# Patient Record
Sex: MALE | Race: BLACK OR AFRICAN AMERICAN
[De-identification: names, ages, dates, MRNs, and addresses within clinical notes are randomized per-mention and may not be internally consistent; named-entity substitution may affect disease eponyms.]

---

## 2021-12-03 ENCOUNTER — HOSPITAL ENCOUNTER (EMERGENCY)
Dept: HOSPITAL 87 - ER | Age: 31
LOS: 1 days | Discharge: HOME | End: 2021-12-04
Payer: MEDICAID

## 2021-12-03 VITALS — WEIGHT: 198.42 LBS | HEIGHT: 77 IN | BODY MASS INDEX: 23.43 KG/M2

## 2021-12-03 DIAGNOSIS — Z20.822: ICD-10-CM

## 2021-12-03 DIAGNOSIS — F31.9: ICD-10-CM

## 2021-12-03 DIAGNOSIS — R45.851: Primary | ICD-10-CM

## 2021-12-03 DIAGNOSIS — F17.290: ICD-10-CM

## 2021-12-03 DIAGNOSIS — F41.9: ICD-10-CM

## 2021-12-03 DIAGNOSIS — F20.9: ICD-10-CM

## 2021-12-03 PROCEDURE — 85025 COMPLETE CBC W/AUTO DIFF WBC: CPT

## 2021-12-03 PROCEDURE — 80307 DRUG TEST PRSMV CHEM ANLYZR: CPT

## 2021-12-03 PROCEDURE — U0003 INFECTIOUS AGENT DETECTION BY NUCLEIC ACID (DNA OR RNA); SEVERE ACUTE RESPIRATORY SYNDROME CORONAVIRUS 2 (SARS-COV-2) (CORONAVIRUS DISEASE [COVID-19]), AMPLIFIED PROBE TECHNIQUE, MAKING USE OF HIGH THROUGHPUT TECHNOLOGIES AS DESCRIBED BY CMS-2020-01-R: HCPCS

## 2021-12-03 PROCEDURE — C9803 HOPD COVID-19 SPEC COLLECT: HCPCS

## 2021-12-03 PROCEDURE — 81003 URINALYSIS AUTO W/O SCOPE: CPT

## 2021-12-03 PROCEDURE — 36415 COLL VENOUS BLD VENIPUNCTURE: CPT

## 2021-12-03 PROCEDURE — 80329 ANALGESICS NON-OPIOID 1 OR 2: CPT

## 2021-12-03 PROCEDURE — G0480 DRUG TEST DEF 1-7 CLASSES: HCPCS

## 2021-12-03 PROCEDURE — 80320 DRUG SCREEN QUANTALCOHOLS: CPT

## 2021-12-03 PROCEDURE — 99285 EMERGENCY DEPT VISIT HI MDM: CPT

## 2021-12-03 PROCEDURE — 80053 COMPREHEN METABOLIC PANEL: CPT

## 2021-12-03 PROCEDURE — 80305 DRUG TEST PRSMV DIR OPT OBS: CPT

## 2021-12-04 VITALS — SYSTOLIC BLOOD PRESSURE: 120 MMHG | DIASTOLIC BLOOD PRESSURE: 68 MMHG

## 2021-12-04 LAB
AMPHETAMINES UR QL SCN: NEGATIVE
APPEARANCE UR: CLEAR
BARBITURATES UR QL SCN: NEGATIVE
BASOPHILS NFR BLD AUTO: 1.1 % (ref 0–2)
BENZODIAZ UR QL SCN: NEGATIVE
BZE UR QL SCN: NEGATIVE
CANNABINOIDS UR QL SCN: (no result)
CHLORIDE SERPL-SCNC: 109 MEQ/L (ref 98–107)
COLOR UR: YELLOW
EOSINOPHIL NFR BLD AUTO: 2.4 % (ref 0–5)
ERYTHROCYTE [DISTWIDTH] IN BLOOD BY AUTOMATED COUNT: 13.8 % (ref 11.6–14.6)
ETHANOL SERPL-MCNC: < 10 MG/DL
HCT VFR BLD AUTO: 41 % (ref 42–52)
HGB BLD-MCNC: 13.8 G/DL (ref 14–18)
HGB UR QL STRIP: NEGATIVE
KETONES UR STRIP-MCNC: NEGATIVE MG/DL
LEUKOCYTE ESTERASE UR QL STRIP: NEGATIVE
LYMPHOCYTES NFR BLD AUTO: 34.2 % (ref 20–50)
MCH RBC QN AUTO: 30.4 PG (ref 28–32)
MCV RBC AUTO: 90.3 FL (ref 80–94)
METHADONE UR QL SCN: NEGATIVE
MONOCYTES NFR BLD AUTO: 8.9 % (ref 2–8)
NEUTROPHILS NFR BLD AUTO: 53.4 % (ref 40–76)
NITRITE UR QL STRIP: NEGATIVE
OPIATES UR QL SCN: NEGATIVE
PCP UR QL SCN: NEGATIVE
PH UR STRIP: 5.5 [PH] (ref 4.5–8)
PLATELET # BLD AUTO: 230 X1000/UL (ref 130–400)
PMV BLD AUTO: 8.5 FL (ref 7.4–10.4)
PROT UR QL STRIP: NEGATIVE
RBC # BLD AUTO: 4.54 MILL/UL (ref 4.7–6.1)
SP GR UR STRIP: 1.01 (ref 1–1.03)
UROBILINOGEN UR STRIP-MCNC: 0.2 E.U./DL (ref 0.2–1)

## 2022-04-25 ENCOUNTER — TELEPHONE (OUTPATIENT)
Dept: BEHAVIORAL HEALTH | Facility: CLINIC | Age: 32
End: 2022-04-25

## 2022-04-25 ENCOUNTER — HOSPITAL ENCOUNTER (INPATIENT)
Facility: CLINIC | Age: 32
LOS: 1 days | Discharge: HOME | DRG: 880 | End: 2022-04-26
Attending: EMERGENCY MEDICINE | Admitting: PSYCHIATRY & NEUROLOGY
Payer: MEDICAID

## 2022-04-25 DIAGNOSIS — F60.2 ANTISOCIAL PERSONALITY DISORDER (H): Primary | ICD-10-CM

## 2022-04-25 DIAGNOSIS — F41.9 ANXIETY: ICD-10-CM

## 2022-04-25 DIAGNOSIS — F20.9 SUBCHRONIC SCHIZOPHRENIA (H): ICD-10-CM

## 2022-04-25 DIAGNOSIS — Z11.52 ENCOUNTER FOR SCREENING LABORATORY TESTING FOR SEVERE ACUTE RESPIRATORY SYNDROME CORONAVIRUS 2 (SARS-COV-2): ICD-10-CM

## 2022-04-25 DIAGNOSIS — F25.0 SCHIZOAFFECTIVE DISORDER, BIPOLAR TYPE (H): ICD-10-CM

## 2022-04-25 DIAGNOSIS — R45.851 SUICIDAL IDEATION: ICD-10-CM

## 2022-04-25 LAB
ALBUMIN SERPL-MCNC: 3.5 G/DL (ref 3.4–5)
ALP SERPL-CCNC: 79 U/L (ref 40–150)
ALT SERPL W P-5'-P-CCNC: 21 U/L (ref 0–70)
AMPHETAMINES UR QL SCN: NORMAL
ANION GAP SERPL CALCULATED.3IONS-SCNC: 3 MMOL/L (ref 3–14)
AST SERPL W P-5'-P-CCNC: 19 U/L (ref 0–45)
BARBITURATES UR QL: NORMAL
BASOPHILS # BLD AUTO: 0.1 10E3/UL (ref 0–0.2)
BASOPHILS NFR BLD AUTO: 1 %
BENZODIAZ UR QL: NORMAL
BILIRUB SERPL-MCNC: 0.5 MG/DL (ref 0.2–1.3)
BUN SERPL-MCNC: 16 MG/DL (ref 7–30)
CALCIUM SERPL-MCNC: 8.9 MG/DL (ref 8.5–10.1)
CANNABINOIDS UR QL SCN: NORMAL
CHLORIDE BLD-SCNC: 108 MMOL/L (ref 94–109)
CO2 SERPL-SCNC: 27 MMOL/L (ref 20–32)
COCAINE UR QL: NORMAL
CREAT SERPL-MCNC: 0.95 MG/DL (ref 0.66–1.25)
EOSINOPHIL # BLD AUTO: 0.2 10E3/UL (ref 0–0.7)
EOSINOPHIL NFR BLD AUTO: 2 %
ERYTHROCYTE [DISTWIDTH] IN BLOOD BY AUTOMATED COUNT: 12.6 % (ref 10–15)
GFR SERPL CREATININE-BSD FRML MDRD: >90 ML/MIN/1.73M2
GLUCOSE BLD-MCNC: 107 MG/DL (ref 70–99)
HCT VFR BLD AUTO: 45 % (ref 40–53)
HGB BLD-MCNC: 15.3 G/DL (ref 13.3–17.7)
IMM GRANULOCYTES # BLD: 0 10E3/UL
IMM GRANULOCYTES NFR BLD: 1 %
LYMPHOCYTES # BLD AUTO: 2.6 10E3/UL (ref 0.8–5.3)
LYMPHOCYTES NFR BLD AUTO: 40 %
MCH RBC QN AUTO: 30.6 PG (ref 26.5–33)
MCHC RBC AUTO-ENTMCNC: 34 G/DL (ref 31.5–36.5)
MCV RBC AUTO: 90 FL (ref 78–100)
MONOCYTES # BLD AUTO: 0.7 10E3/UL (ref 0–1.3)
MONOCYTES NFR BLD AUTO: 11 %
NEUTROPHILS # BLD AUTO: 2.9 10E3/UL (ref 1.6–8.3)
NEUTROPHILS NFR BLD AUTO: 45 %
NRBC # BLD AUTO: 0 10E3/UL
NRBC BLD AUTO-RTO: 0 /100
OPIATES UR QL SCN: NORMAL
PLATELET # BLD AUTO: 221 10E3/UL (ref 150–450)
POTASSIUM BLD-SCNC: 4 MMOL/L (ref 3.4–5.3)
PROT SERPL-MCNC: 6.6 G/DL (ref 6.8–8.8)
RBC # BLD AUTO: 5 10E6/UL (ref 4.4–5.9)
SARS-COV-2 RNA RESP QL NAA+PROBE: NEGATIVE
SODIUM SERPL-SCNC: 138 MMOL/L (ref 133–144)
WBC # BLD AUTO: 6.6 10E3/UL (ref 4–11)

## 2022-04-25 PROCEDURE — 99285 EMERGENCY DEPT VISIT HI MDM: CPT | Mod: 25 | Performed by: EMERGENCY MEDICINE

## 2022-04-25 PROCEDURE — 99291 CRITICAL CARE FIRST HOUR: CPT | Performed by: EMERGENCY MEDICINE

## 2022-04-25 PROCEDURE — 85025 COMPLETE CBC W/AUTO DIFF WBC: CPT | Performed by: FAMILY MEDICINE

## 2022-04-25 PROCEDURE — 90791 PSYCH DIAGNOSTIC EVALUATION: CPT

## 2022-04-25 PROCEDURE — 36415 COLL VENOUS BLD VENIPUNCTURE: CPT | Performed by: FAMILY MEDICINE

## 2022-04-25 PROCEDURE — C9803 HOPD COVID-19 SPEC COLLECT: HCPCS | Performed by: EMERGENCY MEDICINE

## 2022-04-25 PROCEDURE — 250N000013 HC RX MED GY IP 250 OP 250 PS 637: Performed by: EMERGENCY MEDICINE

## 2022-04-25 PROCEDURE — U0005 INFEC AGEN DETEC AMPLI PROBE: HCPCS | Performed by: EMERGENCY MEDICINE

## 2022-04-25 PROCEDURE — 80307 DRUG TEST PRSMV CHEM ANLYZR: CPT | Performed by: EMERGENCY MEDICINE

## 2022-04-25 PROCEDURE — 80053 COMPREHEN METABOLIC PANEL: CPT | Performed by: FAMILY MEDICINE

## 2022-04-25 RX ORDER — POLYETHYLENE GLYCOL 3350 17 G/17G
17 POWDER, FOR SOLUTION ORAL ONCE
Status: COMPLETED | OUTPATIENT
Start: 2022-04-25 | End: 2022-04-25

## 2022-04-25 RX ORDER — OLANZAPINE 10 MG/1
10 TABLET, ORALLY DISINTEGRATING ORAL ONCE
Status: COMPLETED | OUTPATIENT
Start: 2022-04-25 | End: 2022-04-25

## 2022-04-25 RX ADMIN — POLYETHYLENE GLYCOL 3350 17 G: 17 POWDER, FOR SOLUTION ORAL at 15:57

## 2022-04-25 RX ADMIN — OLANZAPINE 10 MG: 10 TABLET, ORALLY DISINTEGRATING ORAL at 22:16

## 2022-04-25 RX ADMIN — OLANZAPINE 10 MG: 10 TABLET, ORALLY DISINTEGRATING ORAL at 04:14

## 2022-04-25 NOTE — PHARMACY-ADMISSION MEDICATION HISTORY
Admission Medication History Completed by Pharmacy    See [facility] Admission Navigator for allergy information, preferred outpatient pharmacy, prior to admission medications and immunization status.     Medication History Sources:     Patient    Sure Scripts    Care Everywhere    Changes made to PTA medication list (reason):    Added: None    Deleted: (all per patient, Care Everywhere, and Sure Scripts)  o Albuterol nebulizer solution: Take 3mL by nebulization every 6 hours as needed for shortness of breath/dyspnea  o Amitriptyline 50mg tablet: Take 1 tablet by mouth at bedtime  o Haloperidol 2mg tablet: Take 1 tablet by mouth 1 times daily  o Hydroxyzine 25mg tablet: Take 2 tablets by mouth daily  o Oxcarbazepine 150mg tablet: Take 1 tablet by mouth 2 times daily  o Polyethylene glycol powder: Take 17g by mouth daily  o Risperidone 4mg tablet: Take 1 tablet by mouth daily    Changed: None    Additional Information:    Patient was a good historian of his medications and confirms that he has not taken any prescription medications, over-the-counters, or supplements in about 6 months.    Patient states that he was previously on bupropion XL 150mg once daily and olanzapine 5mg daily but has not taken either of these in approximately 6 months.    Prior to Admission medications    Not on File       Date completed: 04/25/22    Medication history completed by: Anna Galdamez

## 2022-04-25 NOTE — TELEPHONE ENCOUNTER
Updated Bed Search @1030AM:   Anywhere for placement     [facility]   [facility] @ cap   [facility] @ cap   [facility] @ cap   [facility] @ cap   [facility] @ cap   [facility] @ cap   [facility] @ cap   [facility] @ cap   [facility] @ [facility] posting two avail beds, mixed unit, low acuity only. Pt not appropriate.    [facility] @ cap   [facility] @ cap   [facility] @ cap   [facility] @ cap  [facility] posting one avail bed. Must be on 72 HH.Pt not appropriate.   [facility] @ cap  [facility] @ cap   [facility] @ cap    [facility] posting three avail beds, low acuity only.  Pt not appropriate.   [facility] posting five avail beds. No recent violent/aggression. Pt not appropriate.   [facility] @ cap   [facility] posting one avail bed. Voluntary low acuity pts only.  Pt not appropriate.   [facility] posting two avail beds. [phone]. Per call to [facility], they are able to review for placement. Fax clinical to [phone].   3837am - Clinical faxed for review. Intake awaiting response.   1116am - Intake text paged ED charge requested covid and UDS be collected for review   [facility] posting one avail bed. [phone]. Per call to Yul[facility], they will call their provider to see if the pt would be a good fit w their acuity, and call intake back. Didn't request clinical at this time.   101pm - [facility] called and reports they cannot accommodate the pts acuity level at this time.     Pt remains on work list until appropriate placement is available

## 2022-04-25 NOTE — TELEPHONE ENCOUNTER
Patient cleared and ready for behavioral bed placement: Yes   S: 04:45 DEC call, 330/M, [place] ED, SI and AH    B: Pt endorses paranoia and AH of voices telling him to kill himself by walking into traffic. Pt also attempted to tie a sheet around his neck while in the ED. Hx of schizoaffective d/o, bipolar type. Pt has not been taking any psych medications. No reports of physical aggression. Pt has been to 3 different hospitals within the last 24 hours. Pt reports he used meth a few days ago and marijuana last month. No acute medical concerns. Medically cleared, eating, drinking, ambulating indep    A: Voluntary - anywhere for placement    UDS: needs to be collected  Covid test needs to be ordered/collected    R: Pt placed on work list until appropriate placement is available
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
## 2022-04-25 NOTE — ED NOTES
[facility] ED Mental Health Handoff Note:       Brief HPI:  This is a 32 year old male signed out to me by Dr. Lynch.  See initial ED Provider note for full details of the presentation. Interval history is pertinent for none. Please see Dr. Lynch's note for details of previous Code 21 and restraints.  He was no longer in restraints upon my assumption of care and these had been discontinued at 6 am prior to my arrival.  He is otherwise been calm here.  No further events during my shift.    Home meds reviewed and ordered/administered: No - hasn't taken meds in 6-7 months per pharmacy    Medically stable for inpatient mental health admission: Yes.    Evaluated by mental health: Yes. The recommendation is for inpatient mental health treatment. Bed search in process    Safety concerns: At the time I received sign out, there were no safety concerns.    Hold Status:  Active Orders   Legal    Health Officer Authority to Detain (SHRAVAN)     Frequency: Effective Now     Start Date/Time: 04/25/22 0543      Number of Occurrences: Until Specified     Order Comments: This patient presented with circumstances that have led me to be reasonably suspicious that the patient is at significant risk of self-harm. The patient's judgment to this situation appears to be impaired. Given the circumstances in which the patient presented, it is likely that the patient is at significant risk of attempting self harm if this situation is not investigated further. I am highly concerned that the patient is mentally ill and currently cannot safely care for oneself. This represents endangerment to the patient's well-being and safety, and I am placing a Health Officer Authority hold upon the patient at this time.              Exam:   Patient Vitals for the past 24 hrs:   BP Temp Temp src Pulse Resp SpO2 Weight   04/25/22 1302 101/57 97.2  F (36.2  C) Oral (!) 46 14 98 % --   04/25/22 0143 133/79 97.9  F (36.6  C) Oral 68 16 97 % 91.9 kg (202 lb 8  oz)           ED Course:    Medications   OLANZapine zydis (zyPREXA) ODT tab 10 mg (10 mg Oral Given 4/25/22 4090)   polyethylene glycol (MIRALAX) Packet 17 g (17 g Oral Given 4/25/22 8738)            There were no significant events during my shift.    Patient was signed out to the oncoming provider, Dr. cAkerman      Impression:    ICD-10-CM    1. Suicidal ideation  R45.851        Plan:    1. Awaiting inpatient mental health admission/transfer.      RESULTS:   Results for orders placed or performed during the hospital encounter of 04/25/22 (from the past 24 hour(s))   Asymptomatic COVID-19 Virus (Coronavirus) by PCR Nasopharyngeal     Status: Normal    Collection Time: 04/25/22 12:30 PM    Specimen: Nasopharyngeal; Swab   Result Value Ref Range    SARS CoV2 PCR Negative Negative    Narrative    Testing was performed using the vanessa  SARS-CoV-2 & Influenza A/B Assay on the vanessa  Mariella  System.  This test should be ordered for the detection of SARS-COV-2 in individuals who meet SARS-CoV-2 clinical and/or epidemiological criteria. Test performance is unknown in asymptomatic patients.  This test is for in vitro diagnostic use under the FDA EUA for laboratories certified under CLIA to perform moderate and/or high complexity testing. This test has not been FDA cleared or approved.  A negative test does not rule out the presence of PCR inhibitors in the specimen or target RNA in concentration below the limit of detection for the assay. The possibility of a false negative should be considered if the patient's recent exposure or clinical presentation suggests COVID-19.  [facility] are certified under the Clinical Laboratory Improvement Amendments of 1988 (CLIA-88) as qualified to perform moderate and/or high complexity laboratory testing.   Comprehensive metabolic panel     Status: Abnormal    Collection Time: 04/25/22 12:31 PM   Result Value Ref Range    Sodium 138 133 - 144 mmol/L    Potassium 4.0 3.4 -  5.3 mmol/L    Chloride 108 94 - 109 mmol/L    Carbon Dioxide (CO2) 27 20 - 32 mmol/L    Anion Gap 3 3 - 14 mmol/L    Urea Nitrogen 16 7 - 30 mg/dL    Creatinine 0.95 0.66 - 1.25 mg/dL    Calcium 8.9 8.5 - 10.1 mg/dL    Glucose 107 (H) 70 - 99 mg/dL    Alkaline Phosphatase 79 40 - 150 U/L    AST 19 0 - 45 U/L    ALT 21 0 - 70 U/L    Protein Total 6.6 (L) 6.8 - 8.8 g/dL    Albumin 3.5 3.4 - 5.0 g/dL    Bilirubin Total 0.5 0.2 - 1.3 mg/dL    GFR Estimate >90 >60 mL/min/1.73m2   CBC with platelets differential     Status: None    Collection Time: 04/25/22 12:31 PM    Narrative    The following orders were created for panel order CBC with platelets differential.  Procedure                               Abnormality         Status                     ---------                               -----------         ------                     CBC with platelets and d...[[id number]]                      Final result                 Please view results for these tests on the individual orders.   CBC with platelets and differential     Status: None    Collection Time: 04/25/22 12:31 PM   Result Value Ref Range    WBC Count 6.6 4.0 - 11.0 10e3/uL    RBC Count 5.00 4.40 - 5.90 10e6/uL    Hemoglobin 15.3 13.3 - 17.7 g/dL    Hematocrit 45.0 40.0 - 53.0 %    MCV 90 78 - 100 fL    MCH 30.6 26.5 - 33.0 pg    MCHC 34.0 31.5 - 36.5 g/dL    RDW 12.6 10.0 - 15.0 %    Platelet Count 221 150 - 450 10e3/uL    % Neutrophils 45 %    % Lymphocytes 40 %    % Monocytes 11 %    % Eosinophils 2 %    % Basophils 1 %    % Immature Granulocytes 1 %    NRBCs per 100 WBC 0 <1 /100    Absolute Neutrophils 2.9 1.6 - 8.3 10e3/uL    Absolute Lymphocytes 2.6 0.8 - 5.3 10e3/uL    Absolute Monocytes 0.7 0.0 - 1.3 10e3/uL    Absolute Eosinophils 0.2 0.0 - 0.7 10e3/uL    Absolute Basophils 0.1 0.0 - 0.2 10e3/uL    Absolute Immature Granulocytes 0.0 <=0.4 10e3/uL    Absolute NRBCs 0.0 10e3/uL   Urine Drugs of Abuse Screen     Status: Normal    Collection Time: 04/25/22  12:32 PM    Narrative    The following orders were created for panel order Urine Drugs of Abuse Screen.  Procedure                               Abnormality         Status                     ---------                               -----------         ------                     Drug abuse screen 1 urin...[[id number]]  Normal              Final result                 Please view results for these tests on the individual orders.   Drug abuse screen 1 urine (ED)     Status: Normal    Collection Time: 04/25/22 12:32 PM   Result Value Ref Range    Amphetamines Urine Screen Negative Screen Negative    Barbiturates Urine Screen Negative Screen Negative    Benzodiazepines Urine Screen Negative Screen Negative    Cannabinoids Urine Screen Negative Screen Negative    Cocaine Urine Screen Negative Screen Negative    Opiates Urine Screen Negative Screen Negative             MD Libia Houston Ashley A, MD  04/25/22 3800

## 2022-04-25 NOTE — ED TRIAGE NOTES
Pt. was at hospital earlier and released.  States is having auditory hallucinations that are telling him to jump into traffic.

## 2022-04-25 NOTE — ED NOTES
Pt. sleeping,  when awakened pt. states he is able to contract or safety and If feels unsafe will notify staff.  Restraints discontinued at this time.  Pt. remains on 1:1.

## 2022-04-25 NOTE — ED NOTES
Staff in cameras noticed pt starting to wrap blanket around his neck. Security and floor staff alerted, and staff intervened with pt choke attempt.

## 2022-04-25 NOTE — ED NOTES
"Late entry:  Pt was calm in the HW, gave the phone to pt Re-entry staff asking to speak with pt.  After pt talked to Reentry staff, pt was given Olanzepine as ordered. Pt took the med with out any issues. Pt continued to be calm, pleasant, respectful to writer. Was marlon to safety.  0440  Writer heard commotion, when writer checked on the patient Security officers had pt on the floor, Security was heard saying \" let go\" Security was apparently informed by camera staff that pt had wrapped the blanket around his neck. Pt continued to fight Security officers, was not letting go of his hold on the blanket, had only 1 had secured by Security officers. Code 21 was called. Once patients all extremities were held and secured, CN was holding pt's lower extremities, writer went to the nurses desk to inform MD.  0500  Pt was placed on 5 points restraints as ordered.  Writer was informed by Code 21 team that pt has blood on his face.  0510  While MD was in the room, pt's face was cleaned, pt had a very small nick on L cheek, lower L earlobe and R cheek.  MD aware of the skin openings. No new orders given.  Pt still currently on restraints. Pt placed on SHRAVAN. Copy given to pt  "

## 2022-04-25 NOTE — ED NOTES
This writer was in cameras and noticed patient had blanket over shoulders and was trying to tighten it around neck. Staff in cameras called security as I ran down the hallway and informed security that patient was choking self. 4 security officers attempted to remove blanket but patient was fighting to keep it and tighten it around neck. Security brought patient to floor and finally got blanket away from patient. Code 21 was called in the process and code team arrived and placed patient in 5 points.

## 2022-04-25 NOTE — ED PROVIDER NOTES
[facility] EMERGENCY DEPARTMENT ([facility])  4/25/22  History     Chief Complaint   Patient presents with     Suicidal     Feels like jumping into traffic.  Was at hospital earlier today, but now is feeling suicidal again     HPI  Lalo Kaur is a 32 year old male who has a significant medical history of polysubstance abuse, hx of chronic SI, schizophrenia, anxiety, and homelessness, ASPD, etoh abuse, meth abuser who presents to the Emergency Department with c/o suicidal ideation.  Patient has chronic suicidal ideation.  Reports that he is more suicidal now as he has been taking about his dead mother.  Denies any recent drug ingestions    3rd hospital visit in 24 hours  Reports auditory hallucinations directing him to jump into traffic.   Visual hallucinations of dead people and snakes walking about    Have been seen by [facility] earlier in the day including APS and was cleared for discharge.    He is interested in crisis shelter evaluation for medication adjustment and stabilization    Medically denies any fevers or chills chest pain or shortness of breath.  No recent trauma.      Past Medical History  Past Medical History:   Diagnosis Date     ADHD (attention deficit hyperactivity disorder)      Anxiety      Asthma      Bipolar disorder (H)      Depression      Paranoid schizophrenia (H)      Past Surgical History:   Procedure Laterality Date     ENT SURGERY       albuterol (2.5 MG/3ML) 0.083% nebulizer solution  amitriptyline (ELAVIL) 50 MG tablet  haloperidol (HALDOL) 2 MG tablet  hydrOXYzine (ATARAX) 25 MG tablet  ORDER FOR DME  OXcarbazepine (TRILEPTAL) 150 MG tablet  polyethylene glycol (MIRALAX) powder  risperiDONE (RISPERDAL) 4 MG tablet      Allergies   Allergen Reactions     Acetaminophen Anaphylaxis, Hives and Rash     Guaifenesin Anaphylaxis, Hives and Rash     Other reaction(s): Rash-Hives       Bees Swelling     Guaifenesin-Dm Cr Rash     Family History  Family History   Problem  Relation Age of Onset     Alcohol/Drug Mother      Hypertension Maternal Grandmother      Hypertension Maternal Grandfather      Psychotic Disorder Maternal Grandfather         schizophrenia     Substance Abuse Mother      Suicide Sister              Suicide Mother         attempt     Social History   Social History     Tobacco Use     Smoking status: Current Every Day Smoker     Packs/day: 1.00     Types: Cigarettes     Smokeless tobacco: Never Used   Substance Use Topics     Alcohol use: Yes     Comment: socially     Drug use: Yes     Types: Marijuana      Past medical history, past surgical history, medications, allergies, family history, and social history were reviewed with the patient. No additional pertinent items.     I have reviewed the Medications, Allergies, Past Medical and Surgical History, and Social History in the Epic system.    Review of Systems  A complete review of systems was performed with pertinent positives and negatives noted in the HPI, and all other systems negative.    Physical Exam   BP: 133/79  Pulse: 68  Temp: 97.9  F (36.6  C)  Resp: 16  Weight: 91.9 kg (202 lb 8 oz)  SpO2: 97 %      Physical Exam  GEN: Well appearing, non toxic, cooperative and conversant.   HEENT: The head is normocephalic and atraumatic. Pupils are equal round and reactive to light. Extraocular motions are intact. There is no facial swelling.   CV: Regular rate   PULM: Unlabored breathing     EXT: Full range of motion.  No edema.  NEURO: Cranial nerves II through XII are intact and symmetric. Bilateral upper and lower extremities grossly show full range of motion without any focal deficits.     PSYCH: Calm and cooperative, interactive.     ED Course     .        Procedures         Within an hour after restraint an in person face to face assessment was completed at 0510, including an evaluation of the patient's immediate reaction to the intervention, behavioral assessment and review/assessment of history, drugs  and medications, recent labs, etc., and behavioral condition.  The patient experienced: No adverse physical outcome from seclusion/restraint initiation.  The intervention of restraint or seclusion needs to continue.      Critical care time: 35 minutes of critical care time for this patient with suicidal ideation, attempt suicide by strangulation while in the emergency department requiring code 21 to be called replacement on one-to-one monitoring and frequent reevaluation while on one-to-one and in restraints.    No results found for this or any previous visit (from the past 24 hour(s)).  Medications   OLANZapine zydis (zyPREXA) ODT tab 10 mg (10 mg Oral Given 4/25/22 0414)             Assessments & Plan (with Medical Decision Making)   Lalo Kaur is a 32 year old male presenting for SI  Requesting restart of medication, treatment for etoh and crisis housing.   Pt understood he needs hospital stabilization for this per his report.     Patient seen by DEC  and agrees that he is low risk for suicidality.  Crisis center bed identified and arrangements made for psychiatry as well as medication adjustment as per patient stated initial request.    Was he was told that a crisis center bed was identified he appeared to be disappointed and was wondering whether he would be transported now or later after being admitted.  Was disappointed to hear that a bed was of available immediately and that he could go shortly.    Shortly after this patient was able to acquire a sheet and attempted to strangle himself.  This is immediately identified by camera and the ACT was quickly interrupted by security.     Code 21 called  Patient then placed on one-to-one in 5 point restraints.     On discussion with pt afterwards he simply notes that he is suicidal thinking about his mother.  Appears to be calm and collected during this  Given these events the patient will be admitted for further evaluation and stabilization    I have  reviewed the nursing notes.    I have reviewed the findings, diagnosis, plan and need for follow up with the patient.    New Prescriptions    No medications on file       Final diagnoses:   Suicidal ideation         Eugene Lynch MD  4/25/2022   formerly [facility] EMERGENCY DEPARTMENT     Eugene Lynch MD  04/25/22 0593

## 2022-04-25 NOTE — TELEPHONE ENCOUNTER
Open DBL avail on 32   316pm - Adam paged for review   400pm - Adam called, reports she will look into it and call intake back   408pm - Adam called and reports accepts     R: 32/Thalia/Adam     Pt placed in queue   415pm - unit charge notified, unit will call for report when staffing is sorted   418pm - ED charge notified via text page

## 2022-04-26 ENCOUNTER — TELEPHONE (OUTPATIENT)
Dept: BEHAVIORAL HEALTH | Facility: CLINIC | Age: 32
End: 2022-04-26

## 2022-04-26 VITALS
HEART RATE: 64 BPM | OXYGEN SATURATION: 100 % | TEMPERATURE: 98.6 F | SYSTOLIC BLOOD PRESSURE: 143 MMHG | WEIGHT: 202.5 LBS | BODY MASS INDEX: 24.01 KG/M2 | RESPIRATION RATE: 18 BRPM | DIASTOLIC BLOOD PRESSURE: 78 MMHG

## 2022-04-26 PROBLEM — R45.851 SUICIDAL IDEATION: Status: ACTIVE | Noted: 2022-04-26

## 2022-04-26 PROCEDURE — 99236 HOSP IP/OBS SAME DATE HI 85: CPT | Performed by: PSYCHIATRY & NEUROLOGY

## 2022-04-26 PROCEDURE — 124N000002 HC R&B MH UMMC

## 2022-04-26 RX ORDER — BUPROPION HYDROCHLORIDE 150 MG/1
150 TABLET ORAL EVERY MORNING
Qty: 7 TABLET | Refills: 0 | Status: SHIPPED | OUTPATIENT
Start: 2022-04-26 | End: 2024-06-03

## 2022-04-26 RX ORDER — HYDROXYZINE HYDROCHLORIDE 25 MG/1
25 TABLET, FILM COATED ORAL EVERY 4 HOURS PRN
Status: DISCONTINUED | OUTPATIENT
Start: 2022-04-26 | End: 2022-04-26 | Stop reason: HOSPADM

## 2022-04-26 RX ORDER — OLANZAPINE 10 MG/1
10 TABLET ORAL AT BEDTIME
Qty: 7 TABLET | Refills: 0 | Status: SHIPPED | OUTPATIENT
Start: 2022-04-26

## 2022-04-26 RX ORDER — IBUPROFEN 200 MG
400 TABLET ORAL EVERY 6 HOURS PRN
Status: DISCONTINUED | OUTPATIENT
Start: 2022-04-26 | End: 2022-04-26 | Stop reason: HOSPADM

## 2022-04-26 RX ORDER — AMOXICILLIN 250 MG
1 CAPSULE ORAL 2 TIMES DAILY PRN
Status: DISCONTINUED | OUTPATIENT
Start: 2022-04-26 | End: 2022-04-26 | Stop reason: HOSPADM

## 2022-04-26 RX ORDER — OLANZAPINE 10 MG/1
10 TABLET ORAL 3 TIMES DAILY PRN
Status: DISCONTINUED | OUTPATIENT
Start: 2022-04-26 | End: 2022-04-26 | Stop reason: HOSPADM

## 2022-04-26 RX ORDER — OLANZAPINE 10 MG/2ML
10 INJECTION, POWDER, FOR SOLUTION INTRAMUSCULAR 3 TIMES DAILY PRN
Status: DISCONTINUED | OUTPATIENT
Start: 2022-04-26 | End: 2022-04-26

## 2022-04-26 RX ORDER — MAGNESIUM HYDROXIDE/ALUMINUM HYDROXICE/SIMETHICONE 120; 1200; 1200 MG/30ML; MG/30ML; MG/30ML
30 SUSPENSION ORAL EVERY 4 HOURS PRN
Status: DISCONTINUED | OUTPATIENT
Start: 2022-04-26 | End: 2022-04-26

## 2022-04-26 RX ORDER — MAGNESIUM HYDROXIDE/ALUMINUM HYDROXICE/SIMETHICONE 120; 1200; 1200 MG/30ML; MG/30ML; MG/30ML
30 SUSPENSION ORAL EVERY 4 HOURS PRN
Status: DISCONTINUED | OUTPATIENT
Start: 2022-04-26 | End: 2022-04-26 | Stop reason: HOSPADM

## 2022-04-26 RX ORDER — HYDROXYZINE HYDROCHLORIDE 25 MG/1
25 TABLET, FILM COATED ORAL EVERY 4 HOURS PRN
Status: DISCONTINUED | OUTPATIENT
Start: 2022-04-26 | End: 2022-04-26

## 2022-04-26 RX ORDER — NICOTINE 21 MG/24HR
1 PATCH, TRANSDERMAL 24 HOURS TRANSDERMAL DAILY
Status: DISCONTINUED | OUTPATIENT
Start: 2022-04-26 | End: 2022-04-26 | Stop reason: HOSPADM

## 2022-04-26 RX ORDER — TRAZODONE HYDROCHLORIDE 50 MG/1
50 TABLET, FILM COATED ORAL
Status: DISCONTINUED | OUTPATIENT
Start: 2022-04-26 | End: 2022-04-26

## 2022-04-26 RX ORDER — OLANZAPINE 10 MG/1
10 TABLET ORAL 3 TIMES DAILY PRN
Status: DISCONTINUED | OUTPATIENT
Start: 2022-04-26 | End: 2022-04-26

## 2022-04-26 RX ORDER — TRAZODONE HYDROCHLORIDE 50 MG/1
50 TABLET, FILM COATED ORAL
Status: DISCONTINUED | OUTPATIENT
Start: 2022-04-26 | End: 2022-04-26 | Stop reason: HOSPADM

## 2022-04-26 RX ORDER — OLANZAPINE 10 MG/2ML
10 INJECTION, POWDER, FOR SOLUTION INTRAMUSCULAR 3 TIMES DAILY PRN
Status: DISCONTINUED | OUTPATIENT
Start: 2022-04-26 | End: 2022-04-26 | Stop reason: HOSPADM

## 2022-04-26 RX ORDER — POLYETHYLENE GLYCOL 3350 17 G
2 POWDER IN PACKET (EA) ORAL
Status: DISCONTINUED | OUTPATIENT
Start: 2022-04-26 | End: 2022-04-26 | Stop reason: HOSPADM

## 2022-04-26 RX ORDER — AMOXICILLIN 250 MG
1 CAPSULE ORAL 2 TIMES DAILY PRN
Status: DISCONTINUED | OUTPATIENT
Start: 2022-04-26 | End: 2022-04-26

## 2022-04-26 ASSESSMENT — ACTIVITIES OF DAILY LIVING (ADL)
WEAR_GLASSES_OR_BLIND: NO
CONCENTRATING,_REMEMBERING_OR_MAKING_DECISIONS_DIFFICULTY: NO
ORAL_HYGIENE: INDEPENDENT
TOILETING_ISSUES: NO
LAUNDRY: UNABLE TO COMPLETE
DIFFICULTY_EATING/SWALLOWING: NO
CHANGE_IN_FUNCTIONAL_STATUS_SINCE_ONSET_OF_CURRENT_ILLNESS/INJURY: NO
ORAL_HYGIENE: INDEPENDENT
DRESS: INDEPENDENT
HEARING_DIFFICULTY_OR_DEAF: NO
LAUNDRY: UNABLE TO COMPLETE
DRESSING/BATHING_DIFFICULTY: NO
WALKING_OR_CLIMBING_STAIRS_DIFFICULTY: NO
FALL_HISTORY_WITHIN_LAST_SIX_MONTHS: NO
DIFFICULTY_COMMUNICATING: NO
HYGIENE/GROOMING: INDEPENDENT
DOING_ERRANDS_INDEPENDENTLY_DIFFICULTY: NO
HYGIENE/GROOMING: INDEPENDENT
DRESS: INDEPENDENT

## 2022-04-26 NOTE — PLAN OF CARE
"Pt is a 32 year old male admitted voluntarily to Station 12 from the [facility] ED at 0215 for SI with a plan to jump in front of traffic. Pt also made an attempt to strangle himself while being treated in the ED yesterday. Subsequently, a code 21 was called and the pt was put into 5-point restraints in the ED.    Pt was placed on a SIO 1:1 due to risk of SI with a recent history of an attempt of self-strangulation.     Upon admission to Station 12, the pt was A &O and ambulatory. The pt presented as  uncooperative, hostile, agitated, and restless. Pt refused a clothing search and VS. Pt was also put on an SIO 1:1 due to refusing a clothing search upon admission to the unit.    Pt's belongings includes a full duffel bag with a lock on it. Pt refused to unlock the duffel bag. He denied the duffel bag contained any weapons,  medications, or drugs. The duffel bag was labeled \"unsearched\" and put in the unit exam room.    Pt demanded to be discharged. This RN writer explained he would need to be seen by the provider before he could be discharged. Pt accepted this information and agreed to go to his room. At first, the pt refused to sign any consent for services. He then later signed consents for services. Pt denied SI and stated he was not hearing voices to kill himself. Pt contracted for safety. He refused to answer any further admission questions. Pt was given a meal, which he ate. Pt then went to bed.    Per chart review: Pt has a history of: chronic SI with at least one past SA by hanging, polysubstance abuse including ETOH and meth, schizophrenia, Bipolar disorder, ASPD, learning disorder, ADHD, anxiety, and homelessness. Pt has a hx of malingering. Pt presented to the ED as paranoid and anxious. He reported AH directing him to jump into traffic and VH of dead people and snakes walking about. Pt has a significant hx of past  hospitalizations. Pt reported he has not taken prescribed medication for 6 months but " had s hx of taking Zyprexa and Wellbutrin. He reported he had urges to use substances. He reported his mother  in .     Allergies: acetaminophen, guaifenesin, bees.    COVID test: negative    Drug screen: all negative    Medical concerns: pt denied. Pt has a hx of asthma.    Obtained admission orders from the on-call provider, Dr. Alexey Davidson, including and order for an SIO. Pt put on SI and SIB precautions.
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
## 2022-04-26 NOTE — PLAN OF CARE
"   04/26/22 0218   Valuables   Patient Belongings locker   Patient Belongings Put in Hospital Secure Location (Security or Locker, etc.) briefcase;cell phone/electronics;plastic bag;glasses;keys;shoes;other (see comments);clothing;money (see comment)   Goal Outcome Evaluation:        Belongings    Briefcase (UNSEARCHED - Has a lock on it)    Clothing  -Sweat shirt (black, bear logo, \"born with silver spoon\")  -Brown leather belt  -Black and white  shirt  -Grey jeans  -Shoes black (with red and white text \"champion\")  -Black sweat pants  -[place] winter hat (purple/yellow)  -Pair of grey hospital socks  -Thin black jacket    Small plastic bag  -Agusto Lip Smacker  -Reading glasses  -[place] state advantage card  -[facility] identification card  -Community Card  -Two quarters ($0.50 total)  -Single Fare token  -Whole foods receipts (2)  -[employer] receipt (1)  -Transfer fare ticket  -[place] note  -[place] card  -Empty plastic bag  -Single key (1)  -White outlet  (round with USB port)  -Small black battey pack \"Powerxcel) with USB cord  -Black outlet  \"alcatel\" with USB cord  -Small black Tablet with USB charging cord     A               Admission:  I am responsible for any personal items that are not sent to the safe or pharmacy.  [facility] is not responsible for loss, theft or damage of any property in my possession.    Signature:  _________________________________ Date: _______  Time: _____                                              Staff Signature:  ____________________________ Date: ________  Time: _____      2nd Staff person, if patient is unable/unwilling to sign:    Signature: ________________________________ Date: ________  Time: _____     Discharge:  [facility] has returned all of my personal belongings:    Signature: _________________________________ Date: ________  Time: _____                                          Staff Signature:  ____________________________ Date: " ________  Time: _____
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
## 2022-04-26 NOTE — PLAN OF CARE
04/26/22 1011   Interprofessional Rounds   Participants ;CTC;psychiatrist;nursing;social work   Team Discussion   Participants Felicia Albert MD, Nino Haynes MD (Resident), Ute, RN, Shyann, RN, Shahnaz RN, CORAZON Rahman, CORAZON Bustillo   Progress Continuing to assess   Anticipated length of stay 1 day   Continued Stay Criteria/Rationale New patient and will be assessed today by psychiatrist   Medical/Physical No medical concerns at this time   Plan Attending psychiatrist and Resident doctor will meet with patient to assess psychiatric needs and to discuss medication options. Patient is requesting discharge as  he wishes to return to [facility] where he lives in transitional housing. While hospitalized patient will be encouraged to attend therapy groups and to participate in unit prpogramming. CTC will coordinate disposition and aftercare plan.   Rationale for change in precautions or plan No change   Anticipated Discharge Disposition other (see comments)   PRECAUTIONS AND SAFETY    Behavioral Orders   Procedures    1:1 Nursing    Code 1 - Restrict to Unit    Discontinue 1:1 attendant for suicide risk     Order Specific Question:   I have performed an in person assessment of the patient     Answer:   Based on this assessment the patient no longer requires a one on one attendant at this point in time.     Order Specific Question:   Rationale     Answer:   Medical Record Reviewed     Order Specific Question:   Rationale     Answer:   Patient States able to remain safe in hospital     Order Specific Question:   Rationale     Answer:   Modifications to care environment made to mitigate safety risk    Routine Programming     As clinically indicated    Self Injury Precaution    Status 15     Every 15 minutes.    Status Individual Observation     Patient SIO status reviewed with team/RN.  Please also refer to RN/team documentation for add'l detail.    -SIO staff to monitor following which have  contributed to patient being on SIO:  Patient was noticed via camera trying to tighten a blanket  that was on his shoulders around his neck in the E.R.     -Possible interventions SIO staff could use to support patient's treatment progress:  Assess for marlon to safety while on unit  Distant monitoring to assess for safety while not on close proximity to SIO staff    -When following observed, team will review discontinuation of SIO:  After 24 -48 hours of marlon to safety and psychiatric stabilization with medication adherence.     Order Specific Question:   CONTINUOUS 24 hours / day     Answer:   5 feet     Order Specific Question:   Indications for SIO     Answer:   Suicide risk    Suicide precautions     Patients on Suicide Precautions should have a Combination Diet ordered that includes a Diet selection(s) AND a Behavioral Tray selection for Safe Tray - with utensils, or Safe Tray - NO utensils         Safety  Safety WDL: .WDL except, safety factors  Patient Location: patient room, own  Observed Behavior: angry/hostile  Safety Measures: belongings check completed, clinical history reviewed, environmental rounds completed, safety plan reviewed, safety rounds completed, safety plan mutually developed, self-directed behavior promoted, suicide assessment completed, suicide check-in completed  Diversional Activity: other (see comments) (sleep)  De-Escalation Techniques: 1:1 observation initiated, appropriate behavior reinforced, quiet time facilitated, verbally redirected  Suicidality: Minimal furniture in room, SIO (Status Individual Observation)  (NOTE - order will specify distance, Behavioral scrubs (pajamas), Minimal personal belongings in room, Optimize communication / relationship to minimize opportunity for self-harm

## 2022-04-26 NOTE — PLAN OF CARE
Assessment/Intervention/Current Symtoms and Care Coordination    -Chart review    -Rounded with team, addressed patient needs/concerns    Current Symptoms include the following:  Patient appears in stable mood.  He is requesting to be discharged in order to purchase a bus ticket and return to [facility].    Discharge Plan or Goal  Pending stabilization & development of a safe discharge plan.  Considerations include:  Return to [place] and Transitional  Housing arrangement.    Barriers to Discharge  Patient requires further psychiatric stabilization due to current symptomology    Referral Status  No referrals have been made this hospitalization    Legal Status  Voluntary

## 2022-04-26 NOTE — PLAN OF CARE
Problem: Sleep Disturbance  Goal: Adequate Sleep/Rest  4/26/2022 0622 by Shahnaz Baez, RN  Outcome: Ongoing, Progressing  4/26/2022 0244 by Shahnaz Baez RN  Outcome: Ongoing, Progressing   Goal Outcome Evaluation:    Plan of Care Reviewed With: patient     Pt admitted overnight. Pt appeared to sleep 3.75 hours. Pt continued on an SIO 1:1 for risk of SI. No prns or snacks given. No other concerns reported or observed. Will continue to monitor.

## 2022-04-26 NOTE — TELEPHONE ENCOUNTER
R: 12 / [facility]  00:51 - Charge RN on 12 reviewed and would be able to take pt tonight. 01:06 - Called [facility]. Awaiting callback from provider. 02:06 - On call, Lety, accepts for MH admission. 02:11 - updated unit.

## 2022-04-26 NOTE — PLAN OF CARE
"Pt's SIO 1:1 discontinued due to pt no longer having SI or AH telling him to kill himself. Pt denied SI/SIB/HI/AH/VH. Pt has been pleasant and cooperative for most of the day. He took a shower and ate all meals. The provider put in a discharge order for today for the pt. Pt verbalized readiness and satisfaction with plan to discharge today. Discharge teaching and medication education provided. AVS reviewed and signed. Pt received copy of AVS and verbalizes understanding. Pt stated he plans to follow the provider recommendations for care. Pt denied access to guns. Pt stated he plans to take a bus to the [facility] [employer] to obtain an airline ticket online and then he plans to go to the [employer] [employer] to fly to [place] today. Patient denied any further questions and is now discharged at this time. Pt signed for medications and belongings. Pt expressed agitation that his belongings had been searched by staff at the time of his admission. He stated he wanted to be discharged immediately because \"He is no fool,\" At 1310, the pt left the unit with discharge medications and belongings to aboard [facility] city bus [facility].                 "

## 2022-04-26 NOTE — ED NOTES
ED to Behavioral Floor Handoff    SITUATION  Lalo Kaur is a 32 year old male who speaks English and lives in a home alone The patient arrived in the ED by private car from emergency room with a complaint of Suicidal (Feels like jumping into traffic.  Was at hospital earlier today, but now is feeling suicidal again)  .The patient's current symptoms started/worsened 2 day(s) ago and during this time the symptoms have decreased.   In the ED, pt was diagnosed with   Final diagnoses:   Suicidal ideation        Initial vitals were: BP: 133/79  Pulse: 68  Temp: 97.9  F (36.6  C)  Resp: 16  Weight: 91.9 kg (202 lb 8 oz)  SpO2: 97 %   --------  Is the patient diabetic? No   If yes, last blood glucose? --     If yes, was this treated in the ED? --  --------  Is the patient inebriated (ETOH) No or Impaired on other substances? No  MSSA done? N/A  Last MSSA score: --    Were withdrawal symptoms treated? N/A  Does the patient have a seizure history? No. If yes, date of most recent seizure--  --------  Is the patient patient experiencing suicidal ideation? has a history of suicidal attempts, most recent 2 days ago    Homicidal ideation? denies current or recent homicidal ideation or behaviors.    Self-injurious behavior/urges? reports current or recent self injurious behavior or ideation including wrapping blanket around neck.  ------  Was pt aggressive in the ED No  Was a code called No  Is the pt now cooperative? Yes  -------  Meds given in ED:   Medications   OLANZapine zydis (zyPREXA) ODT tab 10 mg (10 mg Oral Given 4/25/22 0486)   polyethylene glycol (MIRALAX) Packet 17 g (17 g Oral Given 4/25/22 0276)   OLANZapine zydis (zyPREXA) ODT tab 10 mg (10 mg Oral Given 4/25/22 2916)      Family present during ED course? No  Family currently present? No    BACKGROUND  Does the patient have a cognitive impairment or developmental disability? No  Allergies:   Allergies   Allergen Reactions     Acetaminophen Anaphylaxis, Hives and  Rash     Guaifenesin Anaphylaxis, Hives and Rash     Other reaction(s): Rash-Hives       Bees Swelling     Guaifenesin-Dm Cr Rash   .   Social demographics are   Social History     Socioeconomic History     Marital status: Single     Spouse name: None     Number of children: None     Years of education: None     Highest education level: None   Tobacco Use     Smoking status: Current Every Day Smoker     Packs/day: 1.00     Types: Cigarettes     Smokeless tobacco: Never Used   Substance and Sexual Activity     Alcohol use: Yes     Comment: socially     Drug use: Yes     Types: Marijuana        ASSESSMENT  Labs results   Labs Ordered and Resulted from Time of ED Arrival to Time of ED Departure   COMPREHENSIVE METABOLIC PANEL - Abnormal       Result Value    Sodium 138      Potassium 4.0      Chloride 108      Carbon Dioxide (CO2) 27      Anion Gap 3      Urea Nitrogen 16      Creatinine 0.95      Calcium 8.9      Glucose 107 (*)     Alkaline Phosphatase 79      AST 19      ALT 21      Protein Total 6.6 (*)     Albumin 3.5      Bilirubin Total 0.5      GFR Estimate >90     COVID-19 VIRUS (CORONAVIRUS) BY PCR - Normal    SARS CoV2 PCR Negative     DRUG ABUSE SCREEN 1 URINE (ED) - Normal    Amphetamines Urine Screen Negative      Barbiturates Urine Screen Negative      Benzodiazepines Urine Screen Negative      Cannabinoids Urine Screen Negative      Cocaine Urine Screen Negative      Opiates Urine Screen Negative     CBC WITH PLATELETS AND DIFFERENTIAL    WBC Count 6.6      RBC Count 5.00      Hemoglobin 15.3      Hematocrit 45.0      MCV 90      MCH 30.6      MCHC 34.0      RDW 12.6      Platelet Count 221      % Neutrophils 45      % Lymphocytes 40      % Monocytes 11      % Eosinophils 2      % Basophils 1      % Immature Granulocytes 1      NRBCs per 100 WBC 0      Absolute Neutrophils 2.9      Absolute Lymphocytes 2.6      Absolute Monocytes 0.7      Absolute Eosinophils 0.2      Absolute Basophils 0.1       Absolute Immature Granulocytes 0.0      Absolute NRBCs 0.0        Imaging Studies: No results found for this or any previous visit (from the past 24 hour(s)).   Most recent vital signs /64   Pulse 56   Temp 99  F (37.2  C) (Oral)   Resp 18   Wt 91.9 kg (202 lb 8 oz)   SpO2 99%   BMI 24.01 kg/m     Abnormal labs/tests/findings requiring intervention:---   Pain control: pt had none  Nausea control: pt had none    RECOMMENDATION  Are any infection precautions needed (MRSA, VRE, etc.)? No If yes, what infection? --  ---  Does the patient have mobility issues? independently. If yes, what device does the pt use? ---  ---  Is patient on 72 hour hold or commitment? No If on 72 hour hold, have hold and rights been given to patient? No  Are admitting orders written if after 10 p.m. ?N/A    Elizabeth Amanda RN   [phone] [facility]   [phone] [facility]

## 2022-04-26 NOTE — DISCHARGE INSTRUCTIONS
Behavioral Discharge Planning and Instructions    Summary: You were admitted on 4/25/2022 due to mood decompensation in the context of disrupted living situation. You were treated by Felicia Albert MD and discharged on 4/26/22from [facility] in order to purchase a bus ticket and return to [place].      Main Diagnosis:   Suicidal ideation resolved:  Schizoaffective disorder    Health Care Follow-up: Acute Psychiatric (APS) at [facility]   24-hour walk-in crisis intervention and treatment of behavioral emergencies   Suicide Hotline, giving suicidal callers hope through crisis counseling   Crisis intervention phone service for assessment, information, and referral for psychiatric emergencies.   Treatment of psychiatric emergencies such as acute psychotic conditions, panic states, severe and incapacitating depression, suicidal crisis, danger to others, sudden loss of memory, and situations involving grave mental disability.   Community consultation and education on crisis intervention  Located at [address] [place] [address], [address] [phone]    Attend all scheduled appointments with your outpatient providers. Call at least 24 hours in advance if you need to reschedule an appointment to ensure continued access to your outpatient providers.     Major Treatments, Procedures and Findings:  You were provided with: a psychiatric assessment, assessed for medical stability, medication evaluation and/or management, group therapy, and milieu management    Symptoms to Report: feeling more aggressive, increased confusion, losing more sleep, mood getting worse, or thoughts of suicide    Early warning signs can include: increased depression or anxiety sleep disturbances increased thoughts or behaviors of suicide or self-harm  increased unusual thinking, such as paranoia or hearing voices    Safety and Wellness:  Take all medicines as directed.  Make no changes unless your doctor suggests them.      Follow treatment  "recommendations.  Refrain from alcohol and non-prescribed drugs.  Ask your support system to help you reduce your access to items that could harm yourself or others. Items could include:  Firearms  Medicines (both prescribed and over-the-counter)  Knives and other sharp objects  Ropes and like materials  Car keys  If there is a concern for safety, call 911. If there is a concern for safety, call 911.    Resources:   Crisis Intervention: 665.709.6447 or 311-881-2472 (TTY: 536.643.6778).  Call anytime for help.  National [place] on Mental Illness (www.mn.alonso.org): 735.440.7075 or 281-823-8885.  Suicide Awareness Voices of Education (SAVE) (www.save.org): 677-955-KXWJ (4826)  National Suicide Prevention Line (www.mentalhealthmn.org): 062-432-RMNE (5199)  [facility] Crisis (COPE) Response - Adult 350 758-1859  Text 4 Life: txt \"LIFE\" to 17788 for immediate support and crisis intervention  Crisis Intervention: 808.740.8272 or 403-366-5546. Call anytime for help.     General Medication Instructions:   See your medication sheet(s) for instructions.   Take all medicines as directed.  Make no changes unless your doctor suggests them.   Go to all your doctor visits.  Be sure to have all your required lab tests. This way, your medicines can be refilled on time.  Do not use any drugs not prescribed by your doctor.  Avoid alcohol.    Advance Directives:   Scanned document on file with [facility]? No scanned doc  Is document scanned? No. Copy Requested.  Honoring Choices Your Rights Handout: Informed and given  Was more information offered? Pt declined    The Treatment team has appreciated the opportunity to work with you. If you have any questions or concerns about your recent admission, you can contact the unit which can receive your call 24 hours a day, 7 days a week. They will be able to get in touch with a Provider if needed. The unit number is [phone]  "

## 2022-04-26 NOTE — TELEPHONE ENCOUNTER
R:  8:27 PM [facility] unit 32 Charge called updating Intake that Pt cannot be admitted tonight due to another pt needing a private and the pts high acuity level.    Removed pt from que; updated worklist and removed from Admit Board    9:28 PM Updated [place] ED and updated on 32 decline to admit.    ANS left message to call re: Pt.    10:28 PM Beatriz, ANS, discussed Pt not going to 32.  Suggested unit 12.  Intake stated there might be a higher pt on worklist.      10:43 PM Called unit 12 and gave pts info to review to see if able to admit.

## 2022-04-27 ENCOUNTER — TELEPHONE (OUTPATIENT)
Dept: FAMILY MEDICINE | Facility: CLINIC | Age: 32
End: 2022-04-27

## 2022-04-27 ENCOUNTER — PATIENT OUTREACH (OUTPATIENT)
Dept: CARE COORDINATION | Facility: CLINIC | Age: 32
End: 2022-04-27

## 2022-04-27 DIAGNOSIS — Z71.89 OTHER SPECIFIED COUNSELING: ICD-10-CM

## 2022-04-27 NOTE — TELEPHONE ENCOUNTER
Patient does not have an active phone # on file. Per chart review, a central care coordinator indicates they were unable to reach due to no contact information and patient was recently discharged from the hospital and purchasing a ticket and moving to [place].     Routing to Dr. Devi for FYI.     ALVIN Palacios

## 2022-04-27 NOTE — PROGRESS NOTES
Clinic Care Coordination Contact    Background: Care Coordination referral placed from [facility] discharge report for reason of patient meeting criteria for a TCM outreach call by [facility] team.    Assessment: Upon chart review, [facility] Team member will cancel/close the referral for TCM outreach due to reason below:    Patient does not have a contact number listed. Per chart review, pt discharged to buy a ticket to return to [place].    Plan: Care Coordination referral for TCM outreach canceled.    CELINE Frias  [facility] [place], [facility]

## 2022-04-28 NOTE — H&P
Please see discharge summary on same date.     Brittnee Albert MD  [facility]

## 2023-11-06 ENCOUNTER — HOSPITAL ENCOUNTER (EMERGENCY)
Facility: CLINIC | Age: 33
Discharge: HOME | End: 2023-11-06
Attending: EMERGENCY MEDICINE | Admitting: EMERGENCY MEDICINE
Payer: MEDICAID

## 2023-11-06 VITALS
HEART RATE: 50 BPM | RESPIRATION RATE: 16 BRPM | SYSTOLIC BLOOD PRESSURE: 103 MMHG | TEMPERATURE: 97.4 F | DIASTOLIC BLOOD PRESSURE: 72 MMHG | OXYGEN SATURATION: 99 %

## 2023-11-06 DIAGNOSIS — U07.1 COVID-19: ICD-10-CM

## 2023-11-06 DIAGNOSIS — Z59.00 HOMELESSNESS: ICD-10-CM

## 2023-11-06 PROBLEM — F19.90 SUBSTANCE USE DISORDER: Status: ACTIVE | Noted: 2023-11-06

## 2023-11-06 LAB
FLUAV RNA SPEC QL NAA+PROBE: NEGATIVE
FLUBV RNA RESP QL NAA+PROBE: NEGATIVE
GROUP A STREP BY PCR: NOT DETECTED
RSV RNA SPEC NAA+PROBE: NEGATIVE
SARS-COV-2 RNA RESP QL NAA+PROBE: POSITIVE

## 2023-11-06 PROCEDURE — 87651 STREP A DNA AMP PROBE: CPT | Performed by: EMERGENCY MEDICINE

## 2023-11-06 PROCEDURE — 250N000013 HC RX MED GY IP 250 OP 250 PS 637: Performed by: EMERGENCY MEDICINE

## 2023-11-06 PROCEDURE — 99283 EMERGENCY DEPT VISIT LOW MDM: CPT

## 2023-11-06 PROCEDURE — 87637 SARSCOV2&INF A&B&RSV AMP PRB: CPT | Performed by: EMERGENCY MEDICINE

## 2023-11-06 RX ORDER — IBUPROFEN 600 MG/1
600 TABLET, FILM COATED ORAL ONCE
Status: COMPLETED | OUTPATIENT
Start: 2023-11-06 | End: 2023-11-06

## 2023-11-06 RX ADMIN — IBUPROFEN 600 MG: 600 TABLET ORAL at 04:47

## 2023-11-06 SDOH — ECONOMIC STABILITY - HOUSING INSECURITY: HOMELESSNESS UNSPECIFIED: Z59.00

## 2023-11-06 ASSESSMENT — ACTIVITIES OF DAILY LIVING (ADL)
ADLS_ACUITY_SCORE: 35

## 2023-11-06 NOTE — ED NOTES
Bed: ED18  Expected date:   Expected time:   Means of arrival:   Comments:  Meenakshi 542 33M SI, also has a sore throat, eta 0240

## 2023-11-06 NOTE — ED NOTES
"Pt states, \" I have no where to go.\"  This writer gave pt the resources available to him on his discharge instructions and instructed pt to call the number for shelters.  Pt will also be discharged with bus token.   "

## 2023-11-06 NOTE — ED PROVIDER NOTES
History     Chief Complaint:  Suicidal, Hallucinations, Pharyngitis, and Homeless       The history is provided by the patient.      Lalo Kaur is a 33 year old male presenting with presenting with SI, hallucinations, pharyngitis, and homelessness.  He states he had a sore throat over the last 2 days.  Has not had a fever cough or other symptoms.  He reports that the hallucinations and suicidal ideation have been going on for some time.  He states he is not currently taking his medications.      Independent Historian:   None - Patient Only    Review of External Notes:   I reviewed the [facility] Ed note from 11/5/23 for SI.     Medications:    Bupropion   Olanzapine   Ciclesonide inhaler  Albuterol HFA  Naloxone   Benzonatate   Azithromycin     Past Medical History:    ADHD   Anxiety  Asthma  Bipolar disorder   Depression  Paranoid schizophrenia   Suicidal ideation  Chemical dependency   Psychosis   Substance abuse   Homeless   Chronic back pain   Blastomycosis   Dermatitis   Mood disorder   Personality disorder   Smoker   Suicide attempt   Learning disability     Past Surgical History:    ENT surgery     Physical Exam   Patient Vitals for the past 24 hrs:   BP Temp Temp src Pulse Resp SpO2   11/06/23 0302 137/66 97.4  F (36.3  C) Temporal 50 16 98 %      Physical Exam  General: Appears well-developed and well-nourished.   Head: No signs of trauma.   Mouth/Throat: Oropharynx is clear and moist.   Eyes: Conjunctivae are normal.   Neck: Normal range of motion. No nuchal rigidity. No cervical adenopathy  CV: Normal rate and regular rhythm.    Resp: Effort normal and breath sounds normal. No respiratory distress.   MSK: Normal range of motion. n  Neuro: The patient is alert and oriented. Speech normal.  Skin: Skin is warm and dry. No rash noted.   Psych: sleeping in bed      Emergency Department Course        Laboratory:  Labs Ordered and Resulted from Time of ED Arrival to Time of ED Departure   INFLUENZA A/B, RSV, &  SARS-COV2 PCR - Abnormal       Result Value    Influenza A PCR Negative      Influenza B PCR Negative      RSV PCR Negative      SARS CoV2 PCR Positive (*)    GROUP A STREPTOCOCCUS PCR THROAT SWAB - Normal    Group A strep by PCR Not Detected       Emergency Department Course & Assessments:    Interventions:  Medications   ibuprofen (ADVIL/MOTRIN) tablet 600 mg (600 mg Oral $Given 11/6/23 4741)        Assessments/Consultations/Discussion of Management or Tests:  ED Course as of 11/06/23 0633   Mon Nov 06, 2023   0618 I obtained the history and examined the patient as noted above.        Social Determinants of Health affecting care:   Homelessness/Housing Insecurity    Disposition:  Care of the patient was transferred to my colleague Dr. Hernandez pending DEC eval.     Impression & Plan      Medical Decision Making:  Lalo Kaur presents due to a sore throat along with reported hearing voices and suicidal ideation.  Reports that the sore throat has been going on for couple of days.  COVID swab was obtained that did come back positive.  Patient's vital signs were appropriate and I recommended supportive care and he was given a dose of ibuprofen as he reports an allergy to Tylenol.  With regards to the psychosis and suicidal ideation,  patient has been seen very frequently for this.  He was seen yesterday at [facility] on 2 occasions for the same mental health concerns and was cleared at that time.  He had been given a prescription for medications a few days ago, although he states he has not picked them up.  Patient was signed out to Dr. Hernandez with the plan for DEC evaluation.    Diagnosis:    ICD-10-CM    1. COVID-19  U07.1            Discharge Medications:  New Prescriptions    No medications on file      Scribe Disclosure:  ETIENNE, Suma Ngo, am serving as a scribe at 5:14 AM on 11/6/2023 to document services personally performed by Nino Duarte MD based on my observations and the provider's statements to me.    11/6/2023   Nino Duarte MD Bergenstal, John A, MD  11/06/23 0824

## 2023-11-06 NOTE — ED NOTES
Pt calm and cooperative. He changed into  Scrubs without issues. His belonging placed in Pt belongings bag with labeled on and locked up in the cabin in his room.     Video Observation initiated, patient informed.     Laureen Herzog RN

## 2023-11-06 NOTE — ED PROVIDER NOTES
Patient was signed out to me awaiting DEC assessment.  They try to see him now 3 times and every time he is played possum and will not fully awaken.  This time they are a little more forceful over able to have a conversation.  He stated that he wants to go back to homeless shelter but they probably will be able to take him given that he tested positive for COVID today.  In any event he is alert, no longe feeling suicidal, he clearly has resources, he states he wants to get back on his meds and just received a month worth of his meds on first he will be discharged to home.     Jonas Hernandez MD  11/06/23 4120

## 2023-11-06 NOTE — ED TRIAGE NOTES
BIBA. Pt informed EMS that he is homeless, suicidal, auditory hallucination and sore throat x 2 days. Pt reports hearing voices to run into traffic. EMS reports pt sleepy and cooperative en route denied drug or ETOH use. 's, HR 50 .Hx. Schizophrenia hasn't taken his medications for a while.      Triage Assessment (Adult)       Row Name 11/06/23 0303          Triage Assessment    Airway WDL WDL        Respiratory WDL    Respiratory WDL WDL        Skin Circulation/Temperature WDL    Skin Circulation/Temperature WDL WDL        Cardiac WDL    Cardiac WDL WDL        Peripheral/Neurovascular WDL    Peripheral Neurovascular WDL WDL        Cognitive/Neuro/Behavioral WDL    Cognitive/Neuro/Behavioral WDL WDL;X  Falling asleep in betrween triage questionarie.     Level of Consciousness alert

## 2023-11-06 NOTE — CONSULTS
Diagnostic Evaluation Consultation  Crisis Assessment    Patient Name: Lalo Kaur  Age:  33 year old  Legal Sex: male  Gender Identity: male  Pronouns:   Race: Choose not to Answer  Ethnicity: Choose not to answer  Language: English      Patient was assessed: In person      Patient location: [facility] EMERGENCY DEPT                                 Referral Data and Chief Complaint  Lalo Kaur presents to the ED via EMS. Patient is presenting to the ED for the following concerns: Suicidal ideation, Substance use, Worsening psychosocial stress.   Factors that make the mental health crisis life threatening or complex are:  Pt reports presenting to the ED with concern of suicidal ideation and wanting to get restarted on his medication, and to look for a detox facility. Pt provides varying answers when prompted by Writer to discuss suicidal ideation. Pt denies it at times and then endorses having thoughts of hanging himself. When prompted by Writer, Pt denies having a rope to utilize for hanging himself. Pt reports his goal is to get restarted on medication and go to detox. Per chart review, Pt had his medications refilled on 11/01/2023. Regarding substance use, Pt reports he used methamphetamine and drank alcohol yesterday. Pt presented as fatigued, needing several verbal prompts to engage in assessment. Pt was initially attempted to be seen and was observed to have similar presentation. Pt would minimally respond and then start to audibly snore. Per collaboration with nursing, Pt was easily woken and communicative with them prior to Writer's arrival. Pt tested positive for COVID while at ED.      Informed Consent and Assessment Methods  Explained the crisis assessment process, including applicable information disclosures and limits to confidentiality, assessed understanding of the process, and obtained consent to proceed with the assessment.  Assessment methods included conducting a formal  interview with patient, review of medical records, collaboration with medical staff, and obtaining relevant collateral information from family and community providers when available.  : done     Patient response to interventions: acceptance expressed, verbalizes understanding  Coping skills were attempted to reduce the crisis:  Voluntarily brought self to ED.     History of the Crisis   Per chart review, Pt has extensive history of presenting to the ED with concern of substance use/intoxication, suicidal ideation, and ultimately being discharged. Given chart review, it would appear Pt engages in help-seeking behavior and knows how to reach out for assistance when needed.    Brief Psychosocial History  Family:  Single, Children no  Support System:  None  Employment Status:  unemployed  Source of Income:  unable to assess  Financial Environmental Concerns:  unemployed  Current Hobbies:     Barriers in Personal Life:       Significant Clinical History  Current Anxiety Symptoms:     Current Depression/Trauma:  thoughts of death/suicide  Current Somatic Symptoms:     Current Psychosis/Thought Disturbance:     Current Eating Symptoms:     Chemical Use History:  Alcohol: Other (comments) (Pt reports use on 11/5/23.)  Last Use:: 11/05/23  Benzodiazepines: None  Opiates: None  Cocaine: None  Other Use: Methamphetamines  Last Use:: 11/05/23   Past diagnosis:  Substance Use Disorder, Schizophrenia, Personality Disorder  Family history:   (Unable to determine given presentation.)  Past treatment:  Inpatient Hospitalization, Psychiatric Medication Management, Primary Care  Details of most recent treatment:  Chart review indicates Pt had his psychotropic medications refilled on 11/1/2023. Pt was seen on 11/5/2023 at [facility].  Other relevant history:  Pt has extensive history of help-seeking behavior of presenting to the ED when needing assistance.       Collateral Information  Is there collateral information:  (Chart review. Pt was  minimally engaged in assessment and no emergency contact identified.)       Risk Assessment  [place] Suicide Severity Rating Scale Full Clinical Version:  Suicidal Ideation  Q1 Wish to be Dead (Lifetime): Yes  Q2 Non-Specific Active Suicidal Thoughts (Lifetime): Yes  3. Active Suicidal Ideation with any Methods (Not Plan) Without Intent to Act (Lifetime): Yes  Q4 Active Suicidal Ideation with Some Intent to Act, Without Specific Plan (Lifetime): Yes  Q5 Active Suicidal Ideation with Specific Plan and Intent (Lifetime): Yes  Q6 Suicide Behavior (Lifetime): yes     Suicidal Behavior (Lifetime)  Actual Attempt (Lifetime):  (Per chart review and assessment, it is unclear if Pt has attempted suicide. Chart review indicates chronic suicidal ideation.)  Has subject engaged in non-suicidal self-injurious behavior? (Lifetime):  (Per chart review and assessment, it is unclear if Pt has engaged in NSSIB.)  Interrupted Attempts (Lifetime):  (Per chart review and assessment, it is unclear if Pt has attempted suicide. Chart review indicates chronic suicidal ideation.)  Aborted or Self-Interrupted Attempt (Lifetime):  (Per chart review and assessment, it is unclear if Pt has attempted suicide. Chart review indicates chronic suicidal ideation.)  Preparatory Acts or Behavior (Lifetime):  (Per chart review and assessment, it is unclear if Pt has attempted suicide. Chart review indicates chronic suicidal ideation.)    [place] Suicide Severity Rating Scale Recent:   Suicidal Ideation (Recent)  Q1 Wished to be Dead (Past Month): yes  Q2 Suicidal Thoughts (Past Month): yes  Q3 Suicidal Thought Method: yes  Q4 Suicidal Intent without Specific Plan: no  Q5 Suicide Intent with Specific Plan: no  Level of Risk per Screen: moderate risk  Intensity of Ideation (Recent)  Most Severe Ideation Rating (Past 1 Month):  (Pt did not answer the question. Pt denied yet also reported suicidal ideation during assessment. Chart review indicates chronic  suicidal ideation.)  Frequency (Past 1 Month):  (Pt did not answer the question. Pt denied yet also reported suicidal ideation during assessment. Chart review indicates chronic suicidal ideation.)  Duration (Past 1 Month):  (Pt did not answer the question. Pt denied yet also reported suicidal ideation during assessment. Chart review indicates chronic suicidal ideation.)  Controllability (Past 1 Month):  (Pt did not answer the question. Pt denied yet also reported suicidal ideation during assessment. Chart review indicates chronic suicidal ideation.)  Deterrents (Past 1 Month):  (Pt did not answer the question. Pt denied yet also reported suicidal ideation during assessment. Chart review indicates chronic suicidal ideation.)  Reasons for Ideation (Past 1 Month):  (Pt did not answer the question. Pt denied yet also reported suicidal ideation during assessment. Chart review indicates chronic suicidal ideation.)  Suicidal Behavior (Recent)  Actual Attempt (Past 3 Months):  (Pt did not answer the question. Pt denied yet also reported suicidal ideation during assessment. Chart review indicates chronic suicidal ideation.)  Has subject engaged in non-suicidal self-injurious behavior? (Past 3 Months):  (None identified or reported.)  Interrupted Attempts (Past 3 Months):  (Pt did not answer the question. Pt denied yet also reported suicidal ideation during assessment. Chart review indicates chronic suicidal ideation.)  Aborted or Self-Interrupted Attempt (Past 3 Months):  (Pt did not answer the question. Pt denied yet also reported suicidal ideation during assessment. Chart review indicates chronic suicidal ideation.)  Preparatory Acts or Behavior (Past 3 Months): No    Environmental or Psychosocial Events: ongoing abuse of substances, unstable housing, homelessness, unemployment/underemployment  Protective Factors: Protective Factors: help seeking, optimistic outlook - identification of future goals    Does the patient have  thoughts of harming others? Feels Like Hurting Others: no  Previous Attempt to Hurt Others: no  Is the patient engaging in sexually inappropriate behavior?: no    Is the patient engaging in sexually inappropriate behavior?  no        Mental Status Exam   Affect: Blunted  Appearance: Disheveled  Attention Span/Concentration: Other (please comment) (Pt needed several verbal prompts to engage in assessment and would appear to intermittently fall asleep during the assessment.)  Eye Contact: Avoidant (Pt's eyes were closed during the assessment.)    Fund of Knowledge: Appropriate   Language /Speech Content:    Language /Speech Volume: Normal  Language /Speech Rate/Productions: Minimally Responsive  Recent Memory: Intact  Remote Memory: Intact  Mood:    Orientation to Person: Yes   Orientation to Place: Yes  Orientation to Time of Day: Answer (please comment) (Unable to assess given Pt's minimal participation.)  Orientation to Date: Answer (please comment) (Unable to assess given Pt's minimal participation.)     Situation (Do they understand why they are here?): Yes  Psychomotor Behavior: Normal  Thought Content: Suicidal, Clear  Thought Form: Intact     Mini-Cog Assessment  Number of Words Recalled:    Clock-Drawing Test:     Three Item Recall:    Mini-Cog Total Score:       Medication  Psychotropic medications:   Medication Orders - Psychiatric (From admission, onward)      None             Current Care Team  Patient Care Team:  No Ref-Primary, Physician as PCP - General    Diagnosis  Patient Active Problem List   Diagnosis Code    Generalized anxiety disorder F41.1    Suicidal thoughts R45.851    Mild persistent asthma J45.30    Paranoid schizophrenia (H) F20.0    Bipolar affective disorder (H) F31.9    Suicidal ideation R45.851    Substance use disorder F19.90       Primary Problem This Admission  Active Hospital Problems    Substance use disorder      Paranoid schizophrenia (H)        Clinical Summary and  Substantiation of Recommendations   Pt reports presenting to the ED with concern of suicidal ideation and wanting to get restarted on his medication, and to look for a detox facility. Pt provides varying answers when prompted by Writer to discuss suicidal ideation. Pt denies it at times and then endorses having thoughts of hanging himself. When prompted by Writer, Pt denies having a rope to utilize for hanging himself. Pt reports his goal is to get restarted on medication and go to detox. Per chart review, Pt had his medications refilled on 11/01/2023. Regarding substance use, Pt reports he used methamphetamine and drank alcohol yesterday. Pt presented as fatigued, needing several verbal prompts to engage in assessment. Pt was initially attempted to be seen and was observed to have similar presentation    After mental health crisis assessment and consultation with attending provider, Pt's circumstances and mental state were safe for outpatient management. Close follow-up with a psychiatrist and/or therapist was recommended and community homelessness and CD resources were provided. Pt is to return to the ED if any urgent or potentially life-threatening concerns arise.       Pt presents with chronic suicidal ideation secondary to psychosocial stressors. Per chart review, this would appear to be baseline for Pt. Hospitalization is unlikely to modify high risk factors of poor coping skills, ongoing substance use, lack of social support, and non-compliance to an outpatient plan of care. Furthermore, hospitalization may reinforce maladaptive coping skills and lack of self-determination. Pt has a well documented history of help-seeking behaviors in times of distress.    Recommendation of discharge. Protective factors include: displays resiliency, future focused thinking, and help-seeking behaviors.      Patient coping skills attempted to reduce the crisis:  Voluntarily brought self to ED.    Disposition  Recommended  disposition: Individual Therapy, Medication Management, Substance Abuse Disorder Treatment, Detox        Reviewed case and recommendations with attending provider. Attending Name: Dr. Hernandez       Attending concurs with disposition: yes       Patient and/or validated legal guardian concurs with disposition:    (Pt requested detox or treatment for substance use.)       Final disposition:  discharge    Legal status on admission: Voluntary/Patient has signed consent for treatment    Assessment Details   Total duration spent with the patient: 15 min     CPT code(s) utilized: Non-Billable    KELSI Garza, Psychotherapist  [facility] - Triage & Transition Services  Callback: [phone]

## 2023-11-06 NOTE — DISCHARGE INSTRUCTIONS
Aftercare Plan    Appointment information and/or additional resources available to me:     [facility] HOMELESSNESS RESOURCES    For shelter tonight, start with Adult Shelter Connect Overnight Shelter: [phone]  *Phone lines are closed between 5:30-7:30 pm    [place] (enter on the [address] side near the 8th  corner)  Walk-in Hours: 9 am - 5:30 pm Monday-Friday and 1 pm - 5:30 pm Saturday and Sunday    [facility]  [phone]  [address] [facility]ter  [phone]  [address] [facility]    [facility]  [phone]  [address] [facility]  [phone]  [address] [facility]  [phone]  [address] [place] [address]    To start making a plan for a more long-term solution, contact the Coordinated Entry Homeless Assistance Program:    Coordinated Entry Homeless Assistance  [facility]  [address], [place] [address]  [phone]  Open Wednesdays and Thursdays 8 am-2 pm  The Coordinated Entry System is the [facility]'s approach to organizing and providing housing services for people experiencing homelessness in [facility].  Because housing resources are limited, this process is designed to ensure that individuals and families with the highest vulnerability, service needs, and length of homelessness receive top priority in housing placement.    Assessment changes due to COVID-19 virus    [facility] family assessors will now be conducting assessments by phone. If you are working with a family staying in a place other than a [facility]-funded shelter and is eligible for Coordinated Entry, continue to contact [facility]  at [phone] to set up an assessment.    For single adults, [facility] Housing Services will be suspending drop-in hours at [facility]. To have a Coordinated Entry assessment  completed, call the [facility]' certified assessors: Carlos Manuel at [phone] or Sadia at [phone] directly to set up a time to meet    Call 211 at any time on any day for questions about services and resources available to you.      Family Shelters    [facility] Shelter Team  [phone]  [address] [facility], Floors 5 & 6              Youth, families & disabled adults    Hours: Mon-Fri 8:00 am-4:30 pm.  After-Hours Shelter Team  211         Drop-Ins    [facility]  [phone]  [address] [place] [address] ([facility])              Showers/Laundry (8-11am)              Health Care              Employment Services              Breakfast (7-8 am)              Lunch (11:30am-12:30pm)    Hours: Mon-Sat: Summer 7am-3pm; Winter 7am-4pm.         [facility] [phone]  [address] [place] [address]  Hours: Mon, Wed and Fri 9:00-11:30 am      Clothing    [facility]  [phone]  [address] [place] [address]  Hours: M-Th 10-11:30am & 1:30-3:30pm; Sat/Sun 9:30-10:30 am         Free Meals & Showers    [facility]  [phone]  [address] [place] [address]  Dinner: Mon-Fri 5:30-6:30pm (No showers)    [facility]  [phone]  Meals ([address]): Women & Children M-F 8:30-9am; Everyone: M-F 12-1pm; Sat/Sun, 10:30-11:30 am  Showers ([address] [place] [address]): Mon-Fri 9-10 am    [facility]  [phone]  [address]  Dinner: Thurs - Mon 6 pm  Showers: Daily 8 - 4:30 pm    Health Care    [facility]  [phone]  Clinics are in [facility] [place] shelters/drop-in centers.  Hours: Mon-Fri at varying sites. Call for sites/times. No insurance or appts required to receive care.  Clinic sites: [facility] [place], [facility], [facility] [place], [facility], [facility], [facility], [facility], [facility], [facility], [facility], [facility]      Domestic/Sexual Violence  Survivors    [facility]  [phone]  [address] [place] [address]            Singles women, families, survivors of prostitution & domestic abuse    Rape & Sexual Violence Hotline  610-032-NVPU    [facility] Toll-Free 24h crisis line  [phone]     [facility] Crisis [facility]  [phone]      Rule 25 Chemical Health Assessments    [facility]  [phone]  [address] [facility]  Hours: Mon-Fri, 8 am-2:30 pm (first come, first served)    [facility]  [phone]  [address] [facility]  Walk-in: M-F, 7am-4pm (First come, first served or by appt)      Mental Health Care    [facility] ([facility])  Suicidal: [phone] Consultation: [phone]  [address] [facility], 24/7 Crisis Intervention Center     Walk-in Counseling Center  [phone]  Hours: Mon, Wed, Fri 1-3pm; Mon-Thurs 6:30-8:30pm      Veterans Services    [facility] Veterans Service Office  [phone]  Hours: Mon-Fri 8am-4:30 pm; 1st Floor [facility] Community Resource & Referral  [phone]  [address]; Hours: Mon-Fri 7a-5p    [place] Assistance [place] for Vets (MACV)  [phone]    [place] [facility] Supportive Services for Veterans & Families (SSVF)  [phone] [address]    Substance Abuse Resources  To access substance abuse treatment, you must have an assessment complete or have an updated assessment within 30 days of starting any program.  Information for this to be completed and to secure funding if you have medical assistance or no insurance can be found through your [facility]'s chemical health intake line.    If you have private insurance, call the customer service number on the back of your insurance card to find an in-network provider for substance abuse assessment. The ideal provider will be a treatment facility, licensed in the [facility].    For those with Medicaid with the [facility], you will need a Rule 25 assessment: The following are phone numbers for  each On license of [facility]. Rule 25 assessments must be completed by the county you reside in currently. Once approved for funding you can connect with a facility that does Rule 25 assessment.  [place] - [phone]  [place] - [phone]  [place] - [phone]  [facility] [phone]  [facility] [phone]  [place] - [phone]  [place] - [phone]  [facility] [phone]    The following facilities offer Rule 25/chemical health assessments:    [facility]  [phone]  Mon-Friday: [address] [facility] [place], [address]  Saturday:  [address], [place], [address]  M-F assessments (7a-1:45p); Saturday assessments (7:45-10:45a)    [facility]  [phone]  [address] [place], [place], [address]  *by appointment only M-W; walk ins available Fridays from 10-2.    Jd  [phone] (phone consultation available 24/7)  In-person Assessments:  [address] [facility], [address], [place], [place] [address] [place] [address]  [address] [place] [address]  [address] [place] [address]     [facility]  [phone]  [address] [facility]  [place], [place], [address]  *walk in and appointments available by calling    [facility]  [phone] [address] [place], [place], [address]  *by appointment only M-Th    [facility] Adult Mental Health  [phone]  [address] [place], [place], [address]  *walk ins available M-F    [facility]  [phone] [address] [place], [place], [address]  *available by appointments only      [facility]  [phone] [address] [place], [place], [address]  *available by appointment only      [facility]  [phone]  [facility]  [address] [place], [place], [address]    [facility] Drug Abuse [facility] (ADAP)  [phone]  [address] [place] [address], [address]  *Walk in  assessments also available M-F starting at 8 am.    [facility]  [phone]  [address] [place], [place], [address]    *available by appointments      [facility]  [phone]  [address] [place], [place], [address]  *walk in assessments available M-F starting at 7 am.    [facility]  [phone]  [facility]  [address] [place], [place], [address]  *Walk in assessments availble M-F starting at 8 am OR [phone]    [facility]  [address] [place] [address]  *Walk in assessments available M-F starting at 8 am    [facility]  [phone]  [address] [place] [address]    [facility]  [phone]  [address] [place], [place], [address]    *available by appointment only    [facility]  [phone]  [address] [facility] [place], [address]    [facility]  [phone]  [address] [place], [address]  *available by appointment    [facility]  [phone]  [address] [place], [place], [address]  *walk ins available M-TH from 9-3    [facility]  [phone]  [address] [place], [place], [address]    [place]  [phone] [address] [place] [address]  [address] [address]    [facility] (Does Rule 25 Assessments)  [email or web address]  [phone]  [address] [place] [address], [address], [place] [address]    [facility]  [email or web address]  [phone], [address], [place] [address]  [phone], [address], [place] [address]  [phone], [address] [facility], [address], [place] [address]  [phone], [address] [facility] , [address], ([facility]), [place], [place] [address] [phone], [address] [address] [place] [address], [place] [address]      If you are  intoxicated, you may be required to detox at a detox facility before starting treatment. The following are detox facilities that you can self present to. All detox facilities are able to help you complete an assessment/rule 25 prior to discharge if you choose:      [facility]: [address] [place], [place], [address].        [phone]    [facility]: [address] [place], [address]  [phone]    [facility]: [address] [place], [place], [address] [phone]    [facility]: [address] [place], [place], [address] [phone]       It is recommended that you abstain from all mood altering chemicals. Please contact the sober support hotline (153-126-1337) as needed; phones are answered 24 hours a day, 7 days a week. Other support hotlines include:    [facility] Mental Health & Addiction: 1-176.514.6159    [facility] Support Line: 1-246.491.8958    Ways to help cope with sobriety:    -- Take prescribed medicines as scheduled  -- Keep follow-up appointments  -- Talk to others about your concerns  -- Get regular exercise    -- Practice deep breathing skills  -- Eat a healthy diet  -- Use community resources, including hotline numbers, [facility] crisis and support meetings  -- Stay sober and avoid places/people/things associated with substance use    --Maintain a daily schedule/routine    --Get at least 7-8 hours of sleep per night    --Create a list 10--20 healthy activities that you can do that are enjoyable and do not involve substance use    --Create daily goals (approx. 1-4 goals) per day and work to achieve them throughout the day.           [place] [facility] ([facility])  [facility] connects people seeking recovery to resources that help foster and sustain long-term recovery. Whether you are seeking resources for treatment, transportation, housing, job training, education, health care or other pathways to recovery, [facility] is a great place to start.  Phone: 618.590.6159.  www.[facility].org (Great listing of all types of recovery and non-recovery related resources)    Alcoholics Anonymous  Phone: 8-952-ALCOHOL  Website: HTTP://WWW.AA.ORG/    [facility] [place] (704-062-0629 or http://a[place].org)    [facility] [place] (239-070-0249 or www.aastpaul.org)    Narcotics Anonymous  Phone: 497.490.4131  Website: www.Zapper.TriCipher.      [facility] [address], [place], [place],  Phone: [phone]  Drop-in Hours: Monday-Friday 9-11:30 am. By appointment at other times.    Provides: [facility] is a drop-in center on the east side [facility] that provides a safe space for individuals who are home-less and have a history of chemical use. Sobriety is not a requirement but drugs and alcohol are not allowed on the property.    Services: Non-clients can access drop-in services such as Recovery and Harm Reduction Groups, referrals to case management, community activities, shower facilities, and a pool table. Individuals who are homeless and have chemical health needs may be eligible for enrollment into [facility]'s case management program. Clients and  work together to access benefits, treatment, health care, shelter, and external housing re-sources.      [facility] Chemical Assessment & Referral Unit  [address] [place] [address]  Phone: [phone]  Hours: Monday-Friday 8 am-5 pm.    Provides: Rule 25 assessment and referral for individuals seek-ing treatment or counseling for chemical dependency. Must be a resident of [facility]. There is no fee for assessment. There is some funding available for treatment programs.        If I am feeling unsafe or I am in a crisis, I will:   Contact my established care providers   Call the National Suicide Prevention Lifeline: 122.205.6731   Go to the nearest emergency room   Call 911     Your [facility] has a mental health crisis team you can call 24/7: [facility], [phone],  [facility], [phone], and [facility], [phone]    Warning signs that I or other people might notice when a crisis is developing for me: substance use, increase in suicidal ideation.    Things I am able to do on my own to cope or help me feel better:   -Practice square breathing when I begin to feel anxious - in breath through the nose for the count   of 4 and the first line on the square. Out breath through the mouth for the count of 4 for the second line   of the square. Repeat to complete the square. Repeat the square as many times as needed.    Things that I am able to do with others to cope or help me feel better:   -Use community resources, including hotline numbers, [facility] crisis and support meetings    Things I can use or do for distraction:   -Distraction skills of: going for walks, watching TV, spending time outside, calling a friend or family member  -Download a meditation maricruz and spend 15-20 minutes per day mediating/relaxing. Some apps to   download include: Calm, Headspace and Insight Timer. All 3 of these apps have free version    Changes I can make to support my mental health and wellness:   -Attend scheduled mental health therapy and psychiatric appointments and follow all recommendations  -Maintain a daily schedule/routine  -Practice deep breathing skills  -Abstain from all mood altering chemicals not currently prescribed to me     People in my life that I can ask for help:   National [place] on Mental Illness ([place])  699.289.3062 or 1.888.CALVIN.HELPS    Other things that are important when I m in crisis:   -Commit to 30 minutes of self care daily - this can be as simple as taking a shower, going for a walk, cooking a meal, read, writing, etc        Crisis Lines  Crisis Text Line  Text 853203  You will be connected with a trained live crisis counselor to provide support.    Por ana maria, texto  HOLDEN a 269553 o texto a 442-AYUDAME en WhatsApp    National Hope Line   "1.800.SUICIDE [0640420]      Community Resources  Fast Tracker  Linking people to mental health and substance use disorder resources  Patient Home Monitoringn.org     [place] Mental Health Warm Line  Peer to peer support  Monday thru Saturday, 12 pm to 10 pm  231.595.4590 or 7.110.820.0120  Text \"Support\" to [address]    National [place] on Mental Illness ([place])  793.399.4976 or 1.888.CALVIN.HELPS        Mental Health Apps  My3  https://BrieFix.org/    VirtualHopeBox  https://Jammcard/apps/virtual-hope-box/      Additional Information  Today you were seen by a licensed mental health professional through Triage and Transition services, Behavioral Healthcare Providers ([facility])  for a crisis assessment in the Emergency Department at [facility].  It is recommended that you follow up with your established providers (psychiatrist, mental health therapist, and/or primary care doctor - as relevant) as soon as possible. Coordinators from [facility] will be calling you in the next 24-48 hours to ensure that you have the resources you need.  You can also contact [facility] coordinators directly at [phone]. You may have been scheduled for or offered an appointment with a mental health provider. [facility] maintains an extensive network of licensed behavioral health providers to connect patients with the services they need.  We do not charge providers a fee to participate in our referral network.  We match patients with providers based on a patient's specific needs, insurance coverage, and location.  Our first effort will be to refer you to a provider within your care system, and will utilize providers outside your care system as needed.    "

## 2023-11-14 ENCOUNTER — HOSPITAL ENCOUNTER (EMERGENCY)
Facility: CLINIC | Age: 33
Discharge: HOME | End: 2023-11-14
Payer: MEDICAID

## 2023-11-14 VITALS
OXYGEN SATURATION: 99 % | SYSTOLIC BLOOD PRESSURE: 144 MMHG | TEMPERATURE: 97.7 F | WEIGHT: 185 LBS | HEART RATE: 77 BPM | RESPIRATION RATE: 16 BRPM | HEIGHT: 77 IN | BODY MASS INDEX: 21.84 KG/M2 | DIASTOLIC BLOOD PRESSURE: 88 MMHG

## 2023-11-14 NOTE — ED NOTES
"Pt heard screaming from triage room \"help me\" when entering the room, the pt won't answer any questions but says \"I'm having a panic attack\". As attempting to talk him through his breathing, he pretended to pass out and increased his work of breathing. Asked pt to answer questions and he would not answer. Pt asked repeatedly if he took any drugs, he would not answer. Pt asked to sit in waiting room as triage room was needed for other patients.   "

## 2023-11-14 NOTE — ED TRIAGE NOTES
"BIBA when he walked up to police at [facility] and asked to be brought to the ER for SI. Says \"I am sick of being homeless\". Not on a hold. Voluntary.         "

## 2023-11-15 VITALS
OXYGEN SATURATION: 97 % | RESPIRATION RATE: 16 BRPM | TEMPERATURE: 97.1 F | DIASTOLIC BLOOD PRESSURE: 64 MMHG | HEART RATE: 94 BPM | SYSTOLIC BLOOD PRESSURE: 146 MMHG

## 2023-11-15 PROCEDURE — 99282 EMERGENCY DEPT VISIT SF MDM: CPT

## 2023-11-16 ENCOUNTER — HOSPITAL ENCOUNTER (EMERGENCY)
Facility: CLINIC | Age: 33
Discharge: HOME | End: 2023-11-16
Attending: EMERGENCY MEDICINE | Admitting: EMERGENCY MEDICINE
Payer: MEDICAID

## 2023-11-16 DIAGNOSIS — R45.851 SUICIDAL THOUGHTS: ICD-10-CM

## 2023-11-16 ASSESSMENT — ACTIVITIES OF DAILY LIVING (ADL): ADLS_ACUITY_SCORE: 33

## 2023-11-16 NOTE — ED PROVIDER NOTES
"  History     Chief Complaint:  Suicidal       HPI   Lalo Kaur is a 33 year old male who presents with suicidal ideation.  The patient states that he \"just wants to talk to his doctor.\"  Denies any acute complaints.  States he has been feeling suicidal for 3 months.  Denies any evolution or change in symptoms.      Independent Historian:   None - Patient Only    Review of External Notes:   [facility]isse note from yesterday reviewed with the patient presented for suicidal ideation and was seen by the crisis team there.  The crisis team noted that the patient's suicidal ideation and hallucinations are chronic in nature.  They do not believe that admission will be of benefit at that time.      Medications:    buPROPion (WELLBUTRIN XL) 150 MG 24 hr tablet  OLANZapine (ZYPREXA) 10 MG tablet        Past Medical History:    Past Medical History:   Diagnosis Date    ADHD (attention deficit hyperactivity disorder)     Anxiety     Asthma     Bipolar disorder (H)     Depression     Paranoid schizophrenia (H)        Past Surgical History:    Past Surgical History:   Procedure Laterality Date    ENT SURGERY          Physical Exam   Patient Vitals for the past 24 hrs:   BP Temp Temp src Pulse Resp SpO2   11/15/23 2335 (!) 146/64 97.1  F (36.2  C) Temporal 94 16 97 %        Physical Exam  General: Sitting on the ED chair  HEENT: Normocephalic, atraumatic  Cardiac: Well perfused  Pulm: Breathing comfortably, no accessory muscle usage, no conversational dyspnea  MSK: No bony deformities  Skin: Dry  Neuro: Moves all extremities  Psych: Agitated mood and affect         Emergency Department Course     Emergency Department Course & Assessments:    PSS-3      Date and Time Over the past 2 weeks have you felt down, depressed, or hopeless? Over the past 2 weeks have you had thoughts of killing yourself? Have you ever attempted to kill yourself? When did this last happen? User   11/15/23 2340 yes yes yes within the last 24 hours (including " today) ALB                    Interventions:  Medications - No data to display     Assessments:  030 initial evaluation    Independent Interpretation (X-rays, CTs, rhythm strip):  None    Consultations/Discussion of Management or Tests:  None        Social Determinants of Health affecting care:   Financial Insecurity, Homelessness/Housing Insecurity, Stress/Adjustment Disorders, and Social Connections/Isolation    Disposition:  The patient was discharged to home.     Impression & Plan    CMS Diagnoses: None      Medical Decision Makin-year-old male presents with suicidal ideation.  This is chronic in nature after reviewing the chart.  It appears he had a similar presentation yesterday in the [facility] system.  I was unable to elicit any change in circumstance or symptoms today.  The patient's vital signs are stable.  I do not believe that his suicidal thoughts today represent an immediate increased risk of harm to himself after review of the patient's ED encounter yesterday.  I do not believe that further psychiatric consultation or work-up in the ED is warranted tonight.  As such, plan is for discharge home.  I discussed this in detail with the patient and, though he is not satisfied, I do believe that this is the indicated course of action.      Diagnosis:    ICD-10-CM    1. Suicidal thoughts  R45.851               2023   Gilson Plummer MD King, Colin, MD  23 0304

## 2023-11-16 NOTE — ED TRIAGE NOTES
Pt c/o SI starting 3 weeks ago. Pt states he has a plan to jump into water and drown. Pt is homeless      Triage Assessment (Adult)       Row Name 11/15/23 3768          Triage Assessment    Airway WDL WDL        Respiratory WDL    Respiratory WDL WDL        Skin Circulation/Temperature WDL    Skin Circulation/Temperature WDL WDL        Cardiac WDL    Cardiac WDL WDL        Peripheral/Neurovascular WDL    Peripheral Neurovascular WDL WDL        Cognitive/Neuro/Behavioral WDL    Cognitive/Neuro/Behavioral WDL WDL

## 2024-06-03 ENCOUNTER — HOSPITAL ENCOUNTER (EMERGENCY)
Facility: CLINIC | Age: 34
Discharge: HOME | End: 2024-06-03
Attending: EMERGENCY MEDICINE | Admitting: EMERGENCY MEDICINE
Payer: COMMERCIAL

## 2024-06-03 VITALS
HEART RATE: 56 BPM | WEIGHT: 198 LBS | BODY MASS INDEX: 23.38 KG/M2 | DIASTOLIC BLOOD PRESSURE: 67 MMHG | TEMPERATURE: 97.5 F | HEIGHT: 77 IN | RESPIRATION RATE: 16 BRPM | OXYGEN SATURATION: 100 % | SYSTOLIC BLOOD PRESSURE: 128 MMHG

## 2024-06-03 DIAGNOSIS — Z59.00 HOMELESSNESS: ICD-10-CM

## 2024-06-03 DIAGNOSIS — F15.90 STIMULANT USE DISORDER: ICD-10-CM

## 2024-06-03 DIAGNOSIS — F15.94 STIMULANT-INDUCED MOOD DISORDER (H): ICD-10-CM

## 2024-06-03 DIAGNOSIS — F25.0 SCHIZOAFFECTIVE DISORDER, BIPOLAR TYPE (H): ICD-10-CM

## 2024-06-03 DIAGNOSIS — R45.851 SUICIDAL IDEATION: ICD-10-CM

## 2024-06-03 PROBLEM — F60.2 ANTISOCIAL PERSONALITY DISORDER (H): Status: ACTIVE | Noted: 2024-06-03

## 2024-06-03 PROBLEM — R45.850 HOMICIDAL IDEATION: Status: ACTIVE | Noted: 2024-06-03

## 2024-06-03 PROCEDURE — 99235 HOSP IP/OBS SAME DATE MOD 70: CPT | Performed by: PSYCHIATRY & NEUROLOGY

## 2024-06-03 PROCEDURE — 99283 EMERGENCY DEPT VISIT LOW MDM: CPT

## 2024-06-03 PROCEDURE — 250N000013 HC RX MED GY IP 250 OP 250 PS 637: Performed by: PSYCHIATRY & NEUROLOGY

## 2024-06-03 RX ORDER — IBUPROFEN 600 MG/1
600 TABLET, FILM COATED ORAL ONCE
Status: COMPLETED | OUTPATIENT
Start: 2024-06-03 | End: 2024-06-03

## 2024-06-03 RX ORDER — IBUPROFEN 200 MG
400 TABLET ORAL EVERY 8 HOURS PRN
Qty: 10 TABLET | Refills: 0 | Status: SHIPPED | OUTPATIENT
Start: 2024-06-03

## 2024-06-03 RX ORDER — BUPROPION HYDROCHLORIDE 150 MG/1
150 TABLET ORAL EVERY MORNING
Qty: 10 TABLET | Refills: 0 | Status: SHIPPED | OUTPATIENT
Start: 2024-06-03 | End: 2024-06-13

## 2024-06-03 RX ADMIN — IBUPROFEN 600 MG: 600 TABLET ORAL at 13:31

## 2024-06-03 SDOH — ECONOMIC STABILITY - HOUSING INSECURITY: HOMELESSNESS UNSPECIFIED: Z59.00

## 2024-06-03 ASSESSMENT — COLUMBIA-SUICIDE SEVERITY RATING SCALE - C-SSRS
3. HAVE YOU BEEN THINKING ABOUT HOW YOU MIGHT KILL YOURSELF?: YES
1. IN THE PAST MONTH, HAVE YOU WISHED YOU WERE DEAD OR WISHED YOU COULD GO TO SLEEP AND NOT WAKE UP?: YES
5. HAVE YOU STARTED TO WORK OUT OR WORKED OUT THE DETAILS OF HOW TO KILL YOURSELF? DO YOU INTEND TO CARRY OUT THIS PLAN?: YES
2. HAVE YOU ACTUALLY HAD ANY THOUGHTS OF KILLING YOURSELF IN THE PAST MONTH?: YES
4. HAVE YOU HAD THESE THOUGHTS AND HAD SOME INTENTION OF ACTING ON THEM?: NO
6. HAVE YOU EVER DONE ANYTHING, STARTED TO DO ANYTHING, OR PREPARED TO DO ANYTHING TO END YOUR LIFE?: YES

## 2024-06-03 ASSESSMENT — ACTIVITIES OF DAILY LIVING (ADL)
ADLS_ACUITY_SCORE: 35
ADLS_ACUITY_SCORE: 33
ADLS_ACUITY_SCORE: 35

## 2024-06-03 NOTE — DISCHARGE INSTRUCTIONS
Aftercare Plan    Follow up with established providers and supports as scheduled. Continue taking medications as prescribed. Abstain from drugs and alcohol. Utilize your [facility] mental health crisis team as needed. They are available 24/7. Contact information is listed below.     To make future mental health appointments, please call [facility] at [phone].    If I am feeling unsafe or I am in a crisis, I will:   Contact my established care providers  Call [facility]: PH: [phone]  Call the [place] Suicide Prevention Lifeline: 988  Go to the nearest emergency room   Call 911     Warning signs that I or other people might notice when a crisis is developing for me: changes to sleep, appetite or mood, increased anger, agitation or irritability, feeling depressed or hopeless, spending more time alone or talking less, increased crying, decreased productivity, seeing or hearing things that aren't there, thoughts of not wanting to live anymore or of actually killing myself, thoughts of hurting others    Things I am able to do on my own to cope or help me feel better: watching a favorite tv show or movie, listening to music I enjoy, going outside and breathing fresh air, going for a walk or exercising, taking a shower or bath, a cold or hot beverage, a healthy snack, drawing/coloring/painting, journaling, singing or dancing, deep breathing     I can try practicing square breathing when I begin to feel anxious - inhale through the nose for the count of 4 and the first line on the square. Exhale through the mouth for the count of 4 for the second line of the square. Repeat to complete the square. Repeat the square as many times as needed.    I can also use my five senses to practice mindfulness and grounding. What are five things I can see, four things I can hear, three things I can feel, two things I can smell, and one thing I can taste.     Things that I am able to do with others to cope or help  me feel better: sometimes just talking or spending time with someone else, sharing a meal or having coffee, watching a movie or playing a game, going for a walk or exercising    I can also use community resources including mental health hotlines, [facility] crisis teams, or apps.     Things I can use or do for distraction: movies/tv, music, reading, games, drawing/coloring/painting or other art, essential oils, exercise, cleaning/organizing, puzzles, crossword puzzles, word search, Sudoku       I can also download a meditation or relaxation maricruz, like Calm, Headspace, or Insight Timer (all three offer a free version)    Changes I can make to support my mental health and wellness: Attend scheduled mental health therapy and psychiatric appointments. Take my medications as prescribed. Maintain a daily schedule/routine. Abstain from all mood altering substances, including drugs, alcohol, or medications not currently prescribed to me. Implement a self-care routine.      People in my life that I can ask for help: friends or family, trusted teachers/staff/colleagues, trusted members of my community or place of Rastafarian, mental health crisis lines, or 911    Your [facility] has a mental health crisis team you can call 24/7: [facility] Adult, [phone]    Other things that are important when I m in crisis: to remember that the feelings I am having right now are temporary, and it won't feel like this forever, and that it is okay and important to ask for help    [facility] HOMELESSNESS RESOURCES       For shelter tonight, start with Adult [facility] Overnight Shelter: [phone]  *Phone lines are closed between 5:30-7:30 pm    [address] (enter on the [address] side near the [address] corner)  Walk-in Hours: 9 am - 5:30 pm Monday-Friday and 1 pm - 5:30 pm Saturday and Sunday    [facility]  [phone] [address] [facility]   "[phone] [address] [facility]    [facility]  [phone]  [address] [facility]  [phone]  [address] [facility]  [phone]  [address] [place] [address]    Crisis Residences    You may self refer for most Crisis Residence services. This is a short-term service, typically 3-10 days, for stabilization when experiencing a mental health crisis. They provide housing and treatment services that integrate mental health, medical, and substance use care.    [facility] [employer]  Main phone: [phone]   Direct: [phone]   [address] [place] [address]     [facility]   [phone]   [address] [facility] [address]     [facility] [employer]  Main phone: [phone]   Direct: [phone] [address] [place] [address]     [facility]  [phone]   [address] [place] [address]     [facility] **requires referral from a medical facility  [phone] [address] [place] [address]     Wherever you attend for a crisis residence, if possible bring any medications you may have in their prescribed bottles.     Community Resources  Fast Tracker  Linking people to mental health and substance use disorder resources  fasttrackermn.org     [place] Mental Health Warm Line  Peer to peer support  Monday thru Saturday, 12 pm to 10 pm  [phone] or [phone]  Text \"Support\" to 57099    National [place] on Mental Illness (CALVIN)  503.322.1251 or 1.888.CALVIN.HELPS      Mental Health Apps  My3  https://my[employer] Cool[employer] Therapypp.org/    VirtualHopeBox  https://[employer].org/apps/virtual-hope-box/      Additional Information  Today you were seen by a licensed mental health professional through Triage and Transition services, [facility]iders ([facility])  for a crisis assessment in the " Emergency Department at [facility].  It is recommended that you follow up with your established providers (psychiatrist, mental health therapist, and/or primary care doctor - as relevant) as soon as possible. Coordinators from [facility] will be calling you in the next 24-48 hours to ensure that you have the resources you need.  You can also contact [facility] coordinators directly at [phone]. You may have been scheduled for or offered an appointment with a mental health provider. [facility] maintains an extensive network of licensed behavioral health providers to connect patients with the services they need.  We do not charge providers a fee to participate in our referral network.  We match patients with providers based on a patient's specific needs, insurance coverage, and location.  Our first effort will be to refer you to a provider within your care system, and will utilize providers outside your care system as needed.

## 2024-06-03 NOTE — CONSULTS
"Diagnostic Evaluation Consultation  Crisis Assessment    Patient Name: Lalo Kaur  Age:  34 year old  Legal Sex: male  Gender Identity: male  Pronouns:   Race:    Black or   White  Ethnicity: Not  or   Language: English      Patient was assessed: Virtual: [facility] Crisis Assessment Start Time: 0525 Crisis Assessment Stop Time: 0540  Patient location: [facility] EMERGENCY DEPT                             ED18    Referral Data and Chief Complaint  Lalo Kaur presents to the ED via EMS. Patient is presenting to the ED for the following concerns: Suicidal ideation, Other (see comment) (Homicidal ideation).   Factors that make the mental health crisis life threatening or complex are:  Pt reports having schizophrenia and being off meds for about a month. He endorses SI with vague plan of going into traffic or jumping into the water. He also endoses HI towards his roommate. Pt responses seemed somewhat cliche and he did not elaborate on these symptoms when asked. He denies having providers in the area, having moved from [place]..      Informed Consent and Assessment Methods  Explained the crisis assessment process, including applicable information disclosures and limits to confidentiality, assessed understanding of the process, and obtained consent to proceed with the assessment.  Assessment methods included conducting a formal interview with patient, review of medical records, collaboration with medical staff, and obtaining relevant collateral information from family and community providers when available.  : done     Patient response to interventions: needs reinforcement  Coping skills were attempted to reduce the crisis:  none identified     History of the Crisis   Pt recently moved from [place]. He does not have providers established here and states he was \"supposed to be working on going to [facility]\". He has been staying with a friend but now states he had thoughts " about killing the friend, but denies plan or intent. Pt has history of multiple ED visits. PAst diagnosis include  antisocial personality disorder, schizoaffective disorder, SI, substance abuse. meth intoxication and malingering.    Brief Psychosocial History  Family:  Single, Children no  Support System:  None  Employment Status:  unemployed  Source of Income:  unable to assess  Financial Environmental Concerns:  unable to afford medication(s), unable to afford rent/mortgage, unemployed  Current Hobbies:     Barriers in Personal Life:       Significant Clinical History  Current Anxiety Symptoms:   (none reported)  Current Depression/Trauma:  thoughts of death/suicide  Current Somatic Symptoms:   (none reported)  Current Psychosis/Thought Disturbance:   (none reported)  Current Eating Symptoms:   (none reported)  Chemical Use History:  Alcohol:  (denies)  Benzodiazepines:  (denies)  Opiates:  (denies)  Cocaine:  (denies)  Marijuana:  (denies)  Other Use: Methamphetamines (records indicate history ot meth abuse but Pt denies current use)   Past diagnosis:  ADHD, Depression, Personality Disorder, Schizophrenia, Substance Use Disorder  Family history:  No known history of mental health or chemical health concerns  Past treatment:  Individual therapy, Case management, Psychiatric Medication Management  Details of most recent treatment:  Pt had case management and psychiatry in [place]. No current provider  Other relevant history:          Collateral Information  Is there collateral information: No     Collateral information name, relationship, phone number:       What happened today:       What is different about patient's functioning:       Concern about alcohol/drug use:      What do you think the patient needs:      Has patient made comments about wanting to kill themselves/others:      If d/c is recommended, can they take part in safety/aftercare planning:       Additional collateral information:        Risk  Assessment  [place] Suicide Severity Rating Scale Full Clinical Version:  Suicidal Ideation  Q1 Wish to be Dead (Lifetime): Yes  Q2 Non-Specific Active Suicidal Thoughts (Lifetime): Yes  3. Active Suicidal Ideation with any Methods (Not Plan) Without Intent to Act (Lifetime): Yes  Q4 Active Suicidal Ideation with Some Intent to Act, Without Specific Plan (Lifetime): No  Q6 Suicide Behavior (Lifetime): yes     Suicidal Behavior (Lifetime)  Aborted or Self-Interrupted Attempt (Lifetime): No  Preparatory Acts or Behavior (Lifetime): No    [place] Suicide Severity Rating Scale Recent:   Suicidal Ideation (Recent)  Q1 Wished to be Dead (Past Month): yes  Q2 Suicidal Thoughts (Past Month): yes  Q3 Suicidal Thought Method: yes  Q4 Suicidal Intent without Specific Plan: no  Q5 Suicide Intent with Specific Plan: yes (jump in front of traffic)  Within the Past 3 Months?: no  Level of Risk per Screen: high risk          Environmental or Psychosocial Events: challenging interpersonal relationships, barriers to accessing healthcare, unemployment/underemployment, unstable housing, homelessness, ongoing abuse of substances, neither working nor attending school  Protective Factors:      Does the patient have thoughts of harming others? Feels Like Hurting Others: yes (vague thoughts about harming roommate)  Previous Attempt to Hurt Others: no  Violence Threats in Past 6 Months: denies  Current Violence Plan or Thoughts: denies  Is the patient engaging in sexually inappropriate behavior?: no  Duty to warn initiated: no (NA)    Is the patient engaging in sexually inappropriate behavior?  no        Mental Status Exam   Affect: Flat  Appearance: Disheveled  Attention Span/Concentration: Inattentive  Eye Contact: Avoidant    Fund of Knowledge: Delayed   Language /Speech Content: Non-Fluent, Fluent  Language /Speech Volume: Normal  Language /Speech Rate/Productions: Minimally Responsive  Recent Memory: Variable  Remote Memory:  Variable  Mood: Apathetic  Orientation to Person: Yes   Orientation to Place: Yes  Orientation to Time of Day: No  Orientation to Date: No     Situation (Do they understand why they are here?): Yes  Psychomotor Behavior: Underactive  Thought Content: Suicidal, Homicidal  Thought Form: Intact     Mini-Cog Assessment  Number of Words Recalled:    Clock-Drawing Test:     Three Item Recall:    Mini-Cog Total Score:       Medication  Psychotropic medications:   Medication Orders - Psychiatric (From admission, onward)      None             Current Care Team  Patient Care Team:  No Ref-Primary, Physician as PCP - General    Diagnosis  Patient Active Problem List   Diagnosis Code    Generalized anxiety disorder F41.1    Suicidal thoughts R45.851    Mild persistent asthma J45.30    Paranoid schizophrenia (H) F20.0    Suicidal ideation R45.851    Substance use disorder F19.90    Antisocial personality disorder (H) F60.2    Homicidal ideation R45.850       Primary Problem This Admission  Active Hospital Problems    Antisocial personality disorder (H)      Homicidal ideation      Suicidal ideation        Clinical Summary and Substantiation of Recommendations   Pt reports having schizophrenia and being off meds for about a month. He endorses SI with vague plan of going into traffic or jumping into the water. He also endoses HI towards his roommate. Pt responses seemed somewhat cliche and he did not elaborate on these symptoms when asked. He denies having providers in the area since he recently moved from [place]. Recommendation:[facility] for observation, med eval, substance abuse assessment, referrals to local services                          Patient coping skills attempted to reduce the crisis:  none identified    Disposition  Recommended disposition: Observation, Rule 25/JOSE Assessment, Medication Management        Reviewed case and recommendations with attending provider. Attending Name: Dr Duarte       Attending concurs with  disposition: yes       Patient and/or validated legal guardian concurs with disposition:   yes       Final disposition:  observation    Legal status on admission: Voluntary/Patient has signed consent for treatment    Assessment Details   Total duration spent with the patient: 15 min     CPT code(s) utilized: Non-Billable    BISMARK Pendleton, Psychotherapist  [facility] - Triage & Transition Services  Callback: [phone]

## 2024-06-03 NOTE — ED TRIAGE NOTES
Pt comes in by Behavioral Crisis Team as pt called 911 for feeling suicidal. Pt reports feeling suicidal/homicidal (towards roommate) and depression and anxiety.     Triage Assessment (Adult)       Row Name 06/03/24 0341          Triage Assessment    Airway WDL WDL        Respiratory WDL    Respiratory WDL WDL        Skin Circulation/Temperature WDL    Skin Circulation/Temperature WDL WDL        Cardiac WDL    Cardiac WDL WDL        Peripheral/Neurovascular WDL    Peripheral Neurovascular WDL WDL        Cognitive/Neuro/Behavioral WDL    Cognitive/Neuro/Behavioral WDL WDL

## 2024-06-03 NOTE — PROGRESS NOTES
34 year old male with history of schizophrenia received from ED due to SI. Reports having thoughts of running into traffic. He says he's supposed to take Zyprexa and Wellbutrin but hasn't been. He states he's homeless. Upon arriving to unit, patient is very sleepy. He also limps and says he has problems with his feet. Pt also had HI towards his roommate HI. Nursing and risk assessments completed. Assessments reviewed with LMHP and physician. Admission information reviewed with patient. Patient given a tour of [facility] and instructions on using the facility. Questions regarding [facility] addressed. Pt safety search completed.

## 2024-06-03 NOTE — ED PROVIDER NOTES
"  Emergency Department Note      History of Present Illness     Chief Complaint  Suicidal     HPI  Lalo Kaur is a 34 year old male who presents with intermittent suicidal ideations and homicidal ideations towards his roommate for approximately one week. Patient reports he has been dealing with depression, stress and anxiety. Denies currently taking any medications and reports his last dose of Zyprexa and Wellbutrin was approximately one week ago. Reports his last conversation with a psychiatrist was a few days ago when he was at another medical facility. He denies any alcohol use or drug use. He explains he is currently homeless coming from [facility].       Review of External Notes  I reiviewed sichage summary from [facility] from 5/1/24 for suicidal ideation.  Past Medical History   Medical History and Problem List  ADHD  Anxiety  Asthma  Bipolar   Depression  Paranoid schizophrenia    Medications  Wellbutrin  Zyprexa    Surgical History   Ent surgery    Physical Exam   Patient Vitals for the past 24 hrs:   BP Temp Temp src Pulse Resp SpO2 Height Weight   06/03/24 0342 -- 98.2  F (36.8  C) Oral -- -- -- -- --   06/03/24 0340 135/69 -- Oral 96 18 97 % 1.956 m (6' 5\") 89.8 kg (198 lb)     Physical Exam  General: Appears well-developed and well-nourished.   Head: No signs of trauma.   CV: Normal rate and regular rhythm.    Resp: Effort normal and breath sounds normal. No respiratory distress.   GI: Soft. There is no tenderness.  No rebound or guarding.  Normal bowel sounds.    MSK: Normal range of motion. no edema. No Calf tenderness.  Neuro: The patient is alert and oriented. Speech normal.  Skin: Skin is warm and dry. No rash noted.   Psych: Calm and cooperative    Diagnostics     Independent Interpretation  None  ED Course    Medications Administered  Medications - No data to display    Procedures  Procedures     Discussion of Management  DEC     Social Determinants of Health adding to complexity " of care  Homelessness/Housing Insecurity    ED Course  ED Course as of 06/03/24 0625   Mon Jun 03, 2024   5152 I obtained history and examined the patient as noted above.     2912 I spoke with DEC , regarding the patient.        Medical Decision Making / Diagnosis   CMS Diagnoses: None    MIPS     None    MDM  Lalo Kaur is a 34 year old male presents with reports of suicidal ideation.  He states he has been having the symptoms for a number of days.  States he supposed to be on medications but has not been able to get them filled secondary to financial concerns.  He also reports that he is homeless.  On chart review, he has been seen for similar presentations in the past.  I did have DEC speak with the patient, and it is felt that he would be a good candidate for [facility] for stabilization and resources.    Disposition  The patient was transferred to [facility].     ICD-10 Codes:    ICD-10-CM    1. Suicidal ideation  R45.851                  Scribe Disclosure:  I, Roula Johnson, am serving as a scribe at 6:24 AM on 6/3/2024 to document services personally performed by Nino Duarte MD based on my observations and the provider's statements to me.        Nino Duarte MD  06/03/24 8317

## 2024-06-03 NOTE — PLAN OF CARE
Lalo Kaur  Martina 3, 2024  Plan of Care Hand-off Note     Patient Care Path: observation    Plan for Care:   Pt reports having schizophrenia and being off meds for about a month. He endorses SI with vague plan of going into traffic or jumping into the water. He also endoses HI towards his roommate. Pt responses seemed somewhat cliche and he did not elaborate on these symptoms when asked. He denies having providers in the area since he recently moved from [place]. Recommendation:[facility] for observation, med eval, substance abuse assessment, referrals to local services    Identified Goals and Safety Issues:    Restart meds  Substance abuse assessment  Referral to local resources      DUARTE PendletonSW

## 2024-06-03 NOTE — ED NOTES
"\" I am dope sick from meth-I need ativan\" Reports that he is coming off of methamphetamines. Needs \"a lot\" of mental health help. Reports he is depressed and suicidal . Reports desire to go to a [facility] treatment program after stabilizing on [facility]. Having trouble sitting still-wants to be allowed to return to sleep. Very hungry-ate x 2 meals for lunch.   "

## 2024-06-03 NOTE — ED NOTES
"Pt. made a call and immediately approached writer stating he needs to be discharged because he has \"things to do\" Denies being suicidal anymore. Provider notified.   "

## 2024-06-03 NOTE — PROGRESS NOTES
Discharge instructions reviewed with patient including follow-up care plan. Discharge medications, wellbutrin and ibuprofen,  given to patient upon discharge. Educated on medication regime and advised not to stop prescribed medication without consulting their physician. Reviewed safety plan and outpatient resources/appointments.Verbalized understanding of discharge instructions. Denies SI. All belongings which where brought into the hospital have been returned to patient. Escorted off the unit at 17:15 by staff.        Discharged to home via private transportation. Bus tokens given to patient upon discharge.

## 2024-06-03 NOTE — ED PROVIDER NOTES
EmPATH Unit - Psychiatry  Combined Observation Note and Discharge Summary  [facility] Emergency Department  Observation Initiation Date: Marty 3, 2024    Lalo Kaur MRN: [id number]   Age: 34 year old YOB: 1990     History     Chief Complaint   Patient presents with    Suicidal     Pt brought in by Behavioral Crisis team for suicidal/homicidal (towards roommate), depression, and anxiety. Pt supposed to be on zyprexa and wellbutrin but has been off for he thinks a few months.     HPI  Lalo Kaur is a 34 year old male with a past history notable for history of mood instability and psychosis further complicated by substance use disorders involving stimulants and cannabis.  He presents to the emergency department reporting suicidal and homicidal ideation in the context of methamphetamine use.  He was determined to be medically stable and transferred to the EmPATH unit for psychiatric assessment.  He is now approaching 13 hours in the emergency department.  Overnight, there were no acute issues.  Nursing staff report that the patient is requesting to discharge home today.  On examination, the patient reviews with me a frustrating conversation he recently had with his father.  He adamantly states that he is not experiencing homicidal thoughts and has no plan to harm himself or any other individual.  He explains that he simply wants to have better communication with his father.  He was mostly future oriented, discussing his desire to connect with a temp agency in hopes of finding employment.  He finds it difficult to support his lifestyle through support he is receiving from the state.  He is currently homeless and is familiar with staying in shelters.  He denied auditory and visual hallucinations.  He denied active symptoms of psychosis or feelings of paranoia.  He explains that he has taken Wellbutrin for about 4 years with good response.  He denied any association with manic-like symptoms or mood  "dysregulation.  He does not wish to take Zyprexa as he is concerned about long-term side effects of the medication.  He foresees the ability to abstain from substance usage and highlights his desire to pursue AA or NA meetings in the community.  He is requesting to discharge back to the community today.      Past Medical History  Past Medical History:   Diagnosis Date    ADHD (attention deficit hyperactivity disorder)     Anxiety     Asthma     Bipolar disorder (H)     Depression     Paranoid schizophrenia (H)      Past Surgical History:   Procedure Laterality Date    ENT SURGERY       buPROPion (WELLBUTRIN XL) 150 MG 24 hr tablet  ibuprofen (ADVIL/MOTRIN) 200 MG tablet  OLANZapine (ZYPREXA) 10 MG tablet      Allergies   Allergen Reactions    Acetaminophen Anaphylaxis, Hives and Rash    Guaifenesin Anaphylaxis, Hives and Rash     Other reaction(s): Rash-Hives      Bees Swelling    Dextromethorphan-Guaifenesin Rash     Family History  Family History   Problem Relation Age of Onset    Alcohol/Drug Mother     Hypertension Maternal Grandmother     Hypertension Maternal Grandfather     Psychotic Disorder Maternal Grandfather         schizophrenia    Substance Abuse Mother     Suicide Sister             Suicide Mother         attempt     Social History   Social History     Tobacco Use    Smoking status: Every Day     Current packs/day: 1.00     Types: Cigarettes    Smokeless tobacco: Never   Substance Use Topics    Alcohol use: Yes     Comment: socially    Drug use: Yes     Types: Marijuana          Review of Systems  A medically appropriate review of systems was performed with pertinent positives and negatives noted in the HPI, and all other systems negative.    Physical Examination   BP: 135/69  Pulse: 96  Temp: 98.2  F (36.8  C)  Resp: 18  Height: 195.6 cm (6' 5\")  Weight: 89.8 kg (198 lb)  SpO2: 97 %    Physical Exam  General: Appears stated age.   Neuro: Alert and fully oriented. Extremities appear to demonstrate " normal strength on visual inspection.   Integumentary/Skin: no rash visualized, normal color    Psychiatric Examination   Appearance: awake, alert  Attitude:  cooperative  Eye Contact:  fair  Mood:  better  Affect:  appropriate and in normal range  Speech:  clear, coherent  Psychomotor Behavior:  no evidence of tardive dyskinesia, dystonia, or tics  Thought Process:  logical and linear  Associations:  no loose associations  Thought Content:  no evidence of suicidal ideation or homicidal ideation and no evidence of psychotic thought  Insight:  fair  Judgement:  fair  Oriented to:  time, person, and place  Attention Span and Concentration:  fair  Recent and Remote Memory:  fair  Language: able to name/identify objects without impairment  Fund of Knowledge: intact with awareness of current and past events    ED Course     ED Course as of 06/03/24 1541   Mon Jun 03, 2024   0443 I obtained history and examined the patient as noted above.     2586 I spoke with DEC , regarding the patient.          Labs Ordered and Resulted from Time of ED Arrival to Time of ED Departure - No data to display    Assessments & Plan (with Medical Decision Making)   Patient presenting with mood instability and suicidal and homicidal thoughts that were noted upon his initial arrival in the emergency department.  His presentation appears to have been complicated by stimulant use while noting documentation in epic from prior hospitalizations which described a similar presentation.  Symptom intensity has subsided over the course of 12+ hours in the emergency department.  The patient is now requesting to discharge back to the community.  His treatment plan is focused on restarting medications that have been helpful at managing his mood symptoms while helping him to gain access to additional mental health and sobriety promoting resources in the community. Nursing notes reviewed noting no acute issues.     I have reviewed the assessment  completed by the [facility].     During the observation period, the patient did not require medications for agitation, and did not require restraints/seclusion for patient and/or provider safety.    The patient was found to have a psychiatric condition that would benefit from an observation stay in the emergency department for further psychiatric stabilization and/or coordination of a safe disposition. The observation plan includes serial assessments of psychiatric condition, potential administration of medications if indicated, further disposition pending the patient's psychiatric course during the monitoring period.     Preliminary diagnosis:    ICD-10-CM    1. Suicidal ideation  R45.851       2. Homelessness  Z59.00       3. Stimulant use disorder  F15.90       4. 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 Unspecified depressive disorder F25.0 buPROPion (WELLBUTRIN XL) 150 MG 24 hr tablet           Treatment Plan:  -Restart Wellbutrin  mg daily for better management of his depressive symptoms.  -Resources for sobriety support and substance use disorder treatment in the community  -Resources for outpatient mental health treatment  -At this time, the patient does not meet criteria to be placed under an involuntary hold and will be discharged back to the community per his request.    After the period in observation care, the patient's circumstances and mental state were safe for outpatient management. After counseling on the diagnosis, work-up, and treatment plan, the patient was discharged. Close follow-up with a psychiatrist and/or therapist was recommended and community psychiatric resources were provided. Patient is to return to the ED if any urgent or potentially life-threatening concerns.      At the time of discharge, the patient's acute suicide risk was determined to be low due to the following factors: Reduction in the intensity of mood/anxiety symptoms that preceded the admission, denial of  suicidal thoughts, denies feeling helpless or helpless, not currently under the influence of alcohol or illicit substances, denies experiencing command hallucinations, no immediate access to firearms. The patient's acute risk could be higher if noncompliant with their treatment plan, medications, follow-up appointments or using illicit substances or alcohol. Protective factors include: social supports    --  Avelino Sahu MD   [facility] EMERGENCY DEPT  EmPATH Unit        Avelino Sahu MD  06/03/24 2945

## 2024-06-03 NOTE — ED NOTES
[facility]  ED to EMPATH Checklist:      Goal for EMPATH: Depression management and Suicidality    Current Behavior: Calm and Cooperative    Safety Concerns: Suicidal, with a plan to jump into traffic/homicidal towards roommate.    Legal Hold Status: Voluntary    Medically Cleared by ED provider: Yes    Patient Therapeutically Searched: Therapeutic search by ED staff (strings, belts, shoes, pockets, electronics, etc.) and Security wanded    Belongings: In room locker    Independent Ambulation at Baseline: Yes/No: Yes    Participates in Care/Conversation: Yes/No: Yes    Patient Informed about EMPATH: Yes/No: Yes    DEC: Ordered and completed    Patient Ready to be Transferred to EMPATH? Yes/No: Yes